# Patient Record
Sex: FEMALE | Race: WHITE | NOT HISPANIC OR LATINO | Employment: FULL TIME | URBAN - METROPOLITAN AREA
[De-identification: names, ages, dates, MRNs, and addresses within clinical notes are randomized per-mention and may not be internally consistent; named-entity substitution may affect disease eponyms.]

---

## 2023-03-24 LAB — PROLACTIN (UG/L) IN SER/PLAS: 2.2 UG/L (ref 3–20)

## 2024-05-29 DIAGNOSIS — E22.1 HYPERPROLACTINEMIA (MULTI): ICD-10-CM

## 2024-05-30 RX ORDER — CABERGOLINE 0.5 MG/1
0.5 TABLET ORAL SEE ADMIN INSTRUCTIONS
Qty: 24 TABLET | Refills: 0 | Status: SHIPPED | OUTPATIENT
Start: 2024-05-30

## 2024-05-30 NOTE — TELEPHONE ENCOUNTER
Dr cabral refilled a 90d supply of cabergoline, but pt is overdue for exam.  Left detailed message on pts cell stating she needs to call and make an appt.

## 2024-06-19 ENCOUNTER — TELEPHONE (OUTPATIENT)
Dept: OBSTETRICS AND GYNECOLOGY | Facility: CLINIC | Age: 34
End: 2024-06-19
Payer: COMMERCIAL

## 2024-06-19 NOTE — TELEPHONE ENCOUNTER
Pt returned call to office.    Discussed cabergoline script.   Pt informed on 5/30/24 dr cabral sent in a 90d supply that should last her well into 8/2024.  Py has appt with dr cabral on 7/31/24 and should not run out before then.    Understanding voiced.

## 2024-06-19 NOTE — TELEPHONE ENCOUNTER
Attempted to call pt to further discuss her message.     Got her voice mail.     Message left to call office.

## 2024-07-31 ENCOUNTER — APPOINTMENT (OUTPATIENT)
Dept: OBSTETRICS AND GYNECOLOGY | Facility: CLINIC | Age: 34
End: 2024-07-31
Payer: COMMERCIAL

## 2024-07-31 VITALS
DIASTOLIC BLOOD PRESSURE: 60 MMHG | HEIGHT: 62 IN | SYSTOLIC BLOOD PRESSURE: 122 MMHG | BODY MASS INDEX: 32.2 KG/M2 | WEIGHT: 175 LBS

## 2024-07-31 DIAGNOSIS — D35.2 PITUITARY MACROADENOMA (MULTI): Primary | ICD-10-CM

## 2024-07-31 DIAGNOSIS — E22.1 HYPERPROLACTINEMIA (MULTI): ICD-10-CM

## 2024-07-31 PROCEDURE — 3008F BODY MASS INDEX DOCD: CPT | Performed by: OBSTETRICS & GYNECOLOGY

## 2024-07-31 PROCEDURE — 99213 OFFICE O/P EST LOW 20 MIN: CPT | Performed by: OBSTETRICS & GYNECOLOGY

## 2024-07-31 PROCEDURE — 1036F TOBACCO NON-USER: CPT | Performed by: OBSTETRICS & GYNECOLOGY

## 2024-07-31 RX ORDER — CABERGOLINE 0.5 MG/1
0.5 TABLET ORAL SEE ADMIN INSTRUCTIONS
Qty: 24 TABLET | Refills: 1 | Status: SHIPPED | OUTPATIENT
Start: 2024-07-31

## 2024-07-31 ASSESSMENT — PAIN SCALES - GENERAL: PAINLEVEL: 0-NO PAIN

## 2024-07-31 NOTE — PROGRESS NOTES
"Rochelle Joseph is a 33 y.o. here for prolactinoma    HPI: She is going to start trying for pregnancy next month.   She would like to go back to the carboline. 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 They are thinking about next baby in 2024.        Social History     Substance and Sexual Activity   Sexual Activity Yes    Partners: Male    Birth control/protection: None     Objective   /60   Ht 1.575 m (5' 2\")   Wt 79.4 kg (175 lb)   BMI 32.01 kg/m²      General:   Alert and oriented, in no acute distress   Neck:    Breast/Axilla:    Abdomen:    Vulva:    Vagina:    Cervix:    Uterus:    Adnexa:    Pelvic Floor    Psych Normal affect. Normal mood.      Assessment and Plan:  Microadenoma  Cabergoline refill- 0.5 mg 2x a week once a week then once a week alternating.  Prolactin  Planing for pregnancy- folate discussed, next pap 2025  Problem List Items Addressed This Visit       Pituitary macroadenoma (Multi) - Primary    Relevant Orders    Prolactin     Other Visit Diagnoses       Hyperprolactinemia (Multi)        Relevant Medications    cabergoline (Dostinex) 0.5 mg tablet           Orders Placed This Encounter   Procedures    Prolactin     Standing Status:   Future     Standing Expiration Date:   7/31/2025     Order Specific Question:   Release result to [employer]     Answer:   Immediate [1]       "

## 2024-11-08 ENCOUNTER — HOSPITAL ENCOUNTER (OUTPATIENT)
Dept: RADIOLOGY | Facility: HOSPITAL | Age: 34
Discharge: HOME | End: 2024-11-08
Payer: COMMERCIAL

## 2024-11-08 ENCOUNTER — TELEPHONE (OUTPATIENT)
Dept: OBSTETRICS AND GYNECOLOGY | Facility: CLINIC | Age: 34
End: 2024-11-08
Payer: COMMERCIAL

## 2024-11-08 DIAGNOSIS — O20.0 THREATENED MISCARRIAGE IN EARLY PREGNANCY (HHS-HCC): ICD-10-CM

## 2024-11-08 PROCEDURE — 76817 TRANSVAGINAL US OBSTETRIC: CPT

## 2024-11-08 PROCEDURE — 76801 OB US < 14 WKS SINGLE FETUS: CPT

## 2024-11-08 NOTE — TELEPHONE ENCOUNTER
Pt contacted.   Certain LMP of 9/19/24.  Has been actively trying to conceive.  Pt endorses menstrual-like cramping on/off since her + UPT on  or about 10/20/2024.  Denies any bright red bleeding.  Pt did have mucousy, dark brown discharge yesterday.  None since.  Will make dr cabral aware.

## 2024-11-08 NOTE — TELEPHONE ENCOUNTER
Pt contacted.   Pt informed  a viability US has been scheduled for this afternoon at [facility] [address] at 2;30pm.

## 2024-11-20 ENCOUNTER — APPOINTMENT (OUTPATIENT)
Dept: OBSTETRICS AND GYNECOLOGY | Facility: CLINIC | Age: 34
End: 2024-11-20
Payer: COMMERCIAL

## 2024-11-20 ENCOUNTER — LAB (OUTPATIENT)
Dept: LAB | Facility: LAB | Age: 34
End: 2024-11-20
Payer: COMMERCIAL

## 2024-11-20 ENCOUNTER — LAB REQUISITION (OUTPATIENT)
Dept: LAB | Facility: HOSPITAL | Age: 34
End: 2024-11-20
Payer: COMMERCIAL

## 2024-11-20 VITALS — WEIGHT: 177 LBS | BODY MASS INDEX: 32.37 KG/M2 | DIASTOLIC BLOOD PRESSURE: 74 MMHG | SYSTOLIC BLOOD PRESSURE: 118 MMHG

## 2024-11-20 DIAGNOSIS — Z3A.08 8 WEEKS GESTATION OF PREGNANCY (HHS-HCC): Primary | ICD-10-CM

## 2024-11-20 DIAGNOSIS — F41.9 ANXIETY: ICD-10-CM

## 2024-11-20 DIAGNOSIS — O34.219 PREVIOUS CESAREAN SECTION COMPLICATING PREGNANCY (HHS-HCC): ICD-10-CM

## 2024-11-20 DIAGNOSIS — Z34.81 PRENATAL CARE, SUBSEQUENT PREGNANCY IN FIRST TRIMESTER (HHS-HCC): ICD-10-CM

## 2024-11-20 DIAGNOSIS — Z33.1 PREGNANT STATE, INCIDENTAL (HHS-HCC): ICD-10-CM

## 2024-11-20 LAB
ABO GROUP (TYPE) IN BLOOD: NORMAL
ANTIBODY SCREEN: NORMAL
ERYTHROCYTE [DISTWIDTH] IN BLOOD BY AUTOMATED COUNT: 11.5 % (ref 11.5–14.5)
EST. AVERAGE GLUCOSE BLD GHB EST-MCNC: 103 MG/DL
HBA1C MFR BLD: 5.2 %
HBV CORE AB SER QL: NONREACTIVE
HBV SURFACE AB SER-ACNC: 36.4 MIU/ML
HBV SURFACE AG SERPL QL IA: NONREACTIVE
HCT VFR BLD AUTO: 39.1 % (ref 36–46)
HCV AB SER QL: NONREACTIVE
HGB BLD-MCNC: 13 G/DL (ref 12–16)
HIV 1+2 AB+HIV1 P24 AG SERPL QL IA: NONREACTIVE
MCH RBC QN AUTO: 29.5 PG (ref 26–34)
MCHC RBC AUTO-ENTMCNC: 33.2 G/DL (ref 32–36)
MCV RBC AUTO: 89 FL (ref 80–100)
NRBC BLD-RTO: 0 /100 WBCS (ref 0–0)
PLATELET # BLD AUTO: 264 X10*3/UL (ref 150–450)
RBC # BLD AUTO: 4.41 X10*6/UL (ref 4–5.2)
REFLEX ADDED, ANEMIA PANEL: NORMAL
RH FACTOR (ANTIGEN D): NORMAL
RUBV IGG SERPL IA-ACNC: 2 IA
RUBV IGG SERPL QL IA: POSITIVE
TREPONEMA PALLIDUM IGG+IGM AB [PRESENCE] IN SERUM OR PLASMA BY IMMUNOASSAY: NONREACTIVE
WBC # BLD AUTO: 9.7 X10*3/UL (ref 4.4–11.3)

## 2024-11-20 PROCEDURE — 86706 HEP B SURFACE ANTIBODY: CPT

## 2024-11-20 PROCEDURE — 83020 HEMOGLOBIN ELECTROPHORESIS: CPT | Performed by: ADVANCED PRACTICE MIDWIFE

## 2024-11-20 PROCEDURE — 36415 COLL VENOUS BLD VENIPUNCTURE: CPT

## 2024-11-20 PROCEDURE — 86317 IMMUNOASSAY INFECTIOUS AGENT: CPT

## 2024-11-20 PROCEDURE — 87086 URINE CULTURE/COLONY COUNT: CPT

## 2024-11-20 PROCEDURE — 0500F INITIAL PRENATAL CARE VISIT: CPT | Performed by: ADVANCED PRACTICE MIDWIFE

## 2024-11-20 PROCEDURE — 87389 HIV-1 AG W/HIV-1&-2 AB AG IA: CPT

## 2024-11-20 PROCEDURE — 86850 RBC ANTIBODY SCREEN: CPT

## 2024-11-20 PROCEDURE — 86780 TREPONEMA PALLIDUM: CPT

## 2024-11-20 PROCEDURE — 83036 HEMOGLOBIN GLYCOSYLATED A1C: CPT

## 2024-11-20 PROCEDURE — 85027 COMPLETE CBC AUTOMATED: CPT

## 2024-11-20 PROCEDURE — 87340 HEPATITIS B SURFACE AG IA: CPT

## 2024-11-20 PROCEDURE — 86803 HEPATITIS C AB TEST: CPT

## 2024-11-20 PROCEDURE — 83021 HEMOGLOBIN CHROMOTOGRAPHY: CPT

## 2024-11-20 PROCEDURE — 87591 N.GONORRHOEAE DNA AMP PROB: CPT

## 2024-11-20 PROCEDURE — 86901 BLOOD TYPING SEROLOGIC RH(D): CPT

## 2024-11-20 PROCEDURE — 86900 BLOOD TYPING SEROLOGIC ABO: CPT

## 2024-11-20 PROCEDURE — 87491 CHLMYD TRACH DNA AMP PROBE: CPT

## 2024-11-20 PROCEDURE — 86704 HEP B CORE ANTIBODY TOTAL: CPT

## 2024-11-20 ASSESSMENT — EDINBURGH POSTNATAL DEPRESSION SCALE (EPDS)
I HAVE BLAMED MYSELF UNNECESSARILY WHEN THINGS WENT WRONG: NO, NEVER
I HAVE FELT SCARED OR PANICKY FOR NO GOOD REASON: YES, SOMETIMES
I HAVE LOOKED FORWARD WITH ENJOYMENT TO THINGS: RATHER LESS THAN I USED TO
THE THOUGHT OF HARMING MYSELF HAS OCCURRED TO ME: NEVER
I HAVE BEEN ANXIOUS OR WORRIED FOR NO GOOD REASON: YES, VERY OFTEN
I HAVE BEEN SO UNHAPPY THAT I HAVE BEEN CRYING: ONLY OCCASIONALLY
THINGS HAVE BEEN GETTING ON TOP OF ME: YES, SOMETIMES I HAVEN'T BEEN COPING AS WELL AS USUAL
I HAVE BEEN ABLE TO LAUGH AND SEE THE FUNNY SIDE OF THINGS: AS MUCH AS I ALWAYS COULD
I HAVE BEEN SO UNHAPPY THAT I HAVE HAD DIFFICULTY SLEEPING: NOT VERY OFTEN
I HAVE FELT SAD OR MISERABLE: NOT VERY OFTEN
TOTAL SCORE: 11

## 2024-11-20 NOTE — PROGRESS NOTES
NOB/BF1 Given  EPDS= 11    Subjective   Rochelle Joseph is a 34 y.o.  at 8w6d with a working estimated date of delivery of 2025, by Last Menstrual Period who presents for an initial prenatal visit. This pregnancy is planned.    Patient currently experiencing: nausea, declines anti-emetics  Bleeding or cramping since LMP: yes - light brown spotting, had viability scan   Taking prenatal vitamin: Yes  Other concerns today:  Ultrasound completed this pregnancy: Yes - 7weeks   Last pap:     OB History    Para Term  AB Living   3 2 2 0 0 2   SAB IAB Ectopic Multiple Live Births   0 0 0 0 2      # Outcome Date GA Lbr Jules/2nd Weight Sex Type Anes PTL Lv   3 Current            2 Term 22   3.515 kg F CS-LTranv  N SOWMYA   1 Term 10/03/20 41w0d  3.232 kg F CS-LTranv  N SOWMYA      Complications: Fetal Intolerance, Chorioamnionitis     Prior pregnancy complications:  C/S x 2  History of hypertension:  No    Past Medical History:   Diagnosis Date    Encounter for contraceptive management, unspecified 2018    Contraceptive management    Encounter for gynecological examination (general) (routine) without abnormal findings 2018    Well woman exam with routine gynecological exam    Encounter for pregnancy test, result positive ([facility]) 2020    Positive urine pregnancy test    Less than 8 weeks gestation of pregnancy ([facility]) 02/10/2020    7 weeks gestation of pregnancy    Other specified disorders of muscle 10/12/2018    Pelvic floor dysfunction    Other specified health status 2018    Known health problems: none    Personal history of other diseases of the female genital tract 2022    History of vulvodynia    Personal history of other diseases of the female genital tract     History of dyspareunia in female    Personal history of other diseases of the musculoskeletal system and connective tissue 2018    History of muscle spasm    Vaginismus 10/12/2018    Vaginismus     Vaginismus 10/12/2018    Vaginismus      Past Surgical History:   Procedure Laterality Date    TONSILLECTOMY  2018    Tonsillectomy      Social History     Socioeconomic History    Marital status:      Spouse name: Roberto   Tobacco Use    Smoking status: Never    Smokeless tobacco: Never   Vaping Use    Vaping status: Never Used   Substance and Sexual Activity    Alcohol use: Yes     Comment: occ    Drug use: Never    Sexual activity: Yes     Partners: Male     Birth control/protection: None      [place]  Depression Scale Total: 11    Objective   Physical Exam  Weight: 80.3 kg (177 lb)  TW.907 kg (2 lb)   Pregravid BMI: 32.00  BP: 118/74    Physical Exam  Constitutional:       Appearance: Normal appearance. She is normal weight.   Genitourinary:      Genitourinary Comments: Declines    Breasts:     Right: Normal.      Left: Normal.   Cardiovascular:      Rate and Rhythm: Normal rate and regular rhythm.   Pulmonary:      Effort: Pulmonary effort is normal.      Breath sounds: Normal breath sounds.   Abdominal:      General: Abdomen is flat.   Neurological:      Mental Status: She is alert.   Psychiatric:         Mood and Affect: Mood normal.         Behavior: Behavior normal.         Thought Content: Thought content normal.         Judgment: Judgment normal.          Assessment   Problem List Items Addressed This Visit    None  Visit Diagnoses       Pregnant state, incidental ([facility])        Relevant Orders    C. trachomatis / N. gonorrhoeae, Amplified    Urine Culture    CBC Anemia Panel With Reflex,Pregnancy    Hemoglobin Identification with Path Review    Hepatitis B Surface Antigen    Hepatitis C Antibody    HIV 1/2 Antigen/Antibody Screen with Reflex to Confirmation    Rubella Antibody, IgG    Syphilis Screen with Reflex    Type And Screen    [facility] Prenatal Screen    Hemoglobin A1C    Hepatitis B Core Antibody, Total    Hepatitis B Surface Antibody    Trichomonas  vaginalis, Amplified             Plan   - New OB resources provided and reviewed with particular attention to dietary, travel, and medication restrictions  - Oriented to practice, CNM vs. MD care  - Reviewed IOM recommendations for weight gain given pt's BMI: 11-20 pounds (BMI greater than or equal to 30)  - Reviewed bleeding precautions, warning signs, when to call provider; phone number provided  - Routine NOB labs ordered  - NIPT discussed with patient: patient declines genetic testing  - Nuchal Translucency ultrasound ordered  - Transfer care to MD due to Dr. Broussard patient   - Return in 4 weeks for routine prenatal care    LESLYE Mcguire-VALERIAM

## 2024-11-21 PROBLEM — Z34.81 PRENATAL CARE, SUBSEQUENT PREGNANCY IN FIRST TRIMESTER (HHS-HCC): Status: ACTIVE | Noted: 2024-11-21

## 2024-11-21 PROBLEM — Z3A.08 8 WEEKS GESTATION OF PREGNANCY (HHS-HCC): Status: ACTIVE | Noted: 2024-11-21

## 2024-11-21 PROBLEM — F41.9 ANXIETY: Status: ACTIVE | Noted: 2024-11-21

## 2024-11-21 PROBLEM — O34.219 PREVIOUS CESAREAN SECTION COMPLICATING PREGNANCY (HHS-HCC): Status: ACTIVE | Noted: 2024-11-21

## 2024-11-21 LAB
BACTERIA UR CULT: NORMAL
C TRACH RRNA SPEC QL NAA+PROBE: NEGATIVE
HEMOGLOBIN A2: 3.2 % (ref 2–3.5)
HEMOGLOBIN A: 96.5 % (ref 95.8–98)
HEMOGLOBIN F: 0.3 % (ref 0–2)
HEMOGLOBIN IDENTIFICATION INTERPRETATION: NORMAL
N GONORRHOEA DNA SPEC QL PROBE+SIG AMP: NEGATIVE
PATH REVIEW-HGB IDENTIFICATION: NORMAL

## 2024-11-21 NOTE — ASSESSMENT & PLAN NOTE
Visibly anxious. Has need phobia.   Reports she is open to establishing with someone, referral sent.

## 2024-11-22 ENCOUNTER — APPOINTMENT (OUTPATIENT)
Dept: OBSTETRICS AND GYNECOLOGY | Facility: CLINIC | Age: 34
End: 2024-11-22
Payer: COMMERCIAL

## 2024-12-12 ENCOUNTER — HOSPITAL ENCOUNTER (OUTPATIENT)
Dept: RADIOLOGY | Facility: CLINIC | Age: 34
Discharge: HOME | End: 2024-12-12
Payer: COMMERCIAL

## 2024-12-12 DIAGNOSIS — O20.0 THREATENED MISCARRIAGE IN EARLY PREGNANCY (HHS-HCC): ICD-10-CM

## 2024-12-12 PROCEDURE — 76813 OB US NUCHAL MEAS 1 GEST: CPT

## 2024-12-17 ENCOUNTER — APPOINTMENT (OUTPATIENT)
Dept: OBSTETRICS AND GYNECOLOGY | Facility: CLINIC | Age: 34
End: 2024-12-17
Payer: COMMERCIAL

## 2024-12-17 VITALS — WEIGHT: 176 LBS | DIASTOLIC BLOOD PRESSURE: 70 MMHG | BODY MASS INDEX: 32.19 KG/M2 | SYSTOLIC BLOOD PRESSURE: 128 MMHG

## 2024-12-17 DIAGNOSIS — Z34.81 PRENATAL CARE, SUBSEQUENT PREGNANCY IN FIRST TRIMESTER (HHS-HCC): ICD-10-CM

## 2024-12-17 DIAGNOSIS — Z3A.12 12 WEEKS GESTATION OF PREGNANCY (HHS-HCC): Primary | ICD-10-CM

## 2024-12-17 PROCEDURE — 0501F PRENATAL FLOW SHEET: CPT

## 2024-12-17 RX ORDER — NAPROXEN SODIUM 220 MG/1
162 TABLET, FILM COATED ORAL DAILY
Qty: 180 TABLET | Refills: 2 | Status: SHIPPED | OUTPATIENT
Start: 2024-12-17 | End: 2025-09-13

## 2024-12-17 NOTE — PROGRESS NOTES
Assessment/Plan   34 y.o.  at 12w5d  - Reviewed results of NOB labs and NT scan, all wnl  - Reviewed recommendation for universal bASA prophylaxis, pt accepts. Rx sent.   - Inquired about flu vaccination, pt declines  - Routine prenatal care    Follow up in 4 weeks for next prenatal visit. Pt is scheduled with Dr. Broussard.     KIARA Parham    Subjective     Rochelle Joseph is a 34 y.o.  at 12w5d with a working estimated date of delivery of 2025, by Last Menstrual Period presenting for a routine prenatal visit. She is doing well, no concerns. She denies vaginal bleeding, leakage of fluid, contractions, or decreased fetal movement.    Feeling better, less nauseous.     Her pregnancy is complicated by:  Pregnancy Problems (from 24 to present)       Problem Noted Diagnosed Resolved    Prenatal care, subsequent pregnancy in first trimester ([facility]) 2024 by KIARA Mcguire  No    Priority:  Medium       12 weeks gestation of pregnancy ([facility]) 2024 by KIARA Mcguire  No    Priority:  Medium       Overview Addendum 2024  8:48 AM by KIARA Parham     Desired provider in labor: [] CNM  [x] Physician-->repeat c/s x 3  [] Blood Products: [] Yes, accepts [] No, needs counseling  [x] Initial BMI: 32.00   [x] Prenatal Labs: wnl  [x] Cervical Cancer Screening up to date: due    [x] Rh status:  O+  [x] NT US: (11-13 wks): 0.9mm wnl  [] Baby ASA (if indicated):  [x] Pregnancy dated by: LMP = 7w1d scan    [] Anatomy US: (19-20 wks)  [] Federal Sterilization consent signed (if indicated):  [] 1hr GCT at 24-28wks:  [] Rhogam (if indicated):   [] Fetal Surveillance (if indicated):  [] Tdap (27-32 wks, may be given up to 36 wks if initial window missed):   [] RSV (32-36 wks) (Sept. to end of ):   [] Flu Vaccine:    [] Breastfeeding:  [] Postpartum Birth control method:   [] GBS at 36 - 37 wks:  [] 39 weeks discussion of IOL  vs. Expectant management:  [] Mode of delivery ( anticipated ):                   Objective   Weight: 79.8 kg (176 lb)  TW.454 kg (1 lb)   Expected Total Weight Gain: 5 kg (11 lb)-9 kg (19 lb)   Pregravid BMI: 32.00  Pregravid Weight: 79.4 kg (175 lb)   BP: 128/70  Fetal Heart Rate: 167

## 2024-12-18 ENCOUNTER — APPOINTMENT (OUTPATIENT)
Dept: RADIOLOGY | Facility: HOSPITAL | Age: 34
End: 2024-12-18
Payer: COMMERCIAL

## 2024-12-19 ENCOUNTER — APPOINTMENT (OUTPATIENT)
Dept: OBSTETRICS AND GYNECOLOGY | Facility: CLINIC | Age: 34
End: 2024-12-19
Payer: COMMERCIAL

## 2025-01-15 ENCOUNTER — APPOINTMENT (OUTPATIENT)
Dept: OBSTETRICS AND GYNECOLOGY | Facility: CLINIC | Age: 35
End: 2025-01-15
Payer: COMMERCIAL

## 2025-01-15 VITALS — BODY MASS INDEX: 32.56 KG/M2 | SYSTOLIC BLOOD PRESSURE: 106 MMHG | WEIGHT: 178 LBS | DIASTOLIC BLOOD PRESSURE: 58 MMHG

## 2025-01-15 DIAGNOSIS — Z3A.12 12 WEEKS GESTATION OF PREGNANCY (HHS-HCC): ICD-10-CM

## 2025-01-15 DIAGNOSIS — Z34.82 PRENATAL CARE, SUBSEQUENT PREGNANCY IN SECOND TRIMESTER (HHS-HCC): ICD-10-CM

## 2025-01-15 DIAGNOSIS — Z3A.16 16 WEEKS GESTATION OF PREGNANCY (HHS-HCC): Primary | ICD-10-CM

## 2025-01-15 DIAGNOSIS — Z34.81 PRENATAL CARE, SUBSEQUENT PREGNANCY IN FIRST TRIMESTER (HHS-HCC): ICD-10-CM

## 2025-01-15 PROCEDURE — 0501F PRENATAL FLOW SHEET: CPT | Performed by: ADVANCED PRACTICE MIDWIFE

## 2025-01-15 RX ORDER — NAPROXEN SODIUM 220 MG/1
162 TABLET, FILM COATED ORAL DAILY
Qty: 180 TABLET | Refills: 2 | Status: SHIPPED | OUTPATIENT
Start: 2025-01-15 | End: 2025-10-12

## 2025-01-15 NOTE — PROGRESS NOTES
Subjective     Rochelle Joseph is a 34 y.o.  at 16w6d with a working estimated date of delivery of 2025, by Last Menstrual Period who presents for a routine prenatal visit.     She denies vaginal bleeding, leakage of fluid, decreased fetal movements, or contractions.    Objective   Physical Exam  Weight: 80.7 kg (178 lb)  TW.361 kg (3 lb)  BP: 106/58       Postpartum Depression: High Risk (2024)    [place]  Depression Scale     Last EPDS Total Score: 11     Last EPDS Self Harm Result: Never         Assessment/Plan   Problem List Items Addressed This Visit          Medium    Prenatal care, subsequent pregnancy in first trimester ([facility])    Relevant Medications    aspirin 81 mg chewable tablet     Other Visit Diagnoses       12 weeks gestation of pregnancy ([facility])        Relevant Medications    aspirin 81 mg chewable tablet          Anatomy US scheduled  Discussed flu vaccine, patient declined  Reviewed s/sx of PTL, warning signs, fetal movement counts, and when to call provider  Follow up in 4 weeks for a routine prenatal visit.    LESLYE Mcguire-CLARK

## 2025-02-10 ENCOUNTER — HOSPITAL ENCOUNTER (OUTPATIENT)
Dept: RADIOLOGY | Facility: CLINIC | Age: 35
Discharge: HOME | End: 2025-02-10
Payer: COMMERCIAL

## 2025-02-10 DIAGNOSIS — O20.0 THREATENED MISCARRIAGE IN EARLY PREGNANCY (HHS-HCC): ICD-10-CM

## 2025-02-10 PROBLEM — Z3A.20 20 WEEKS GESTATION OF PREGNANCY (HHS-HCC): Status: ACTIVE | Noted: 2024-11-21

## 2025-02-10 PROCEDURE — 76811 OB US DETAILED SNGL FETUS: CPT | Performed by: STUDENT IN AN ORGANIZED HEALTH CARE EDUCATION/TRAINING PROGRAM

## 2025-02-10 PROCEDURE — 76811 OB US DETAILED SNGL FETUS: CPT

## 2025-02-10 NOTE — PROGRESS NOTES
Reg pack given       Ob Follow-up  25     SUBJECTIVE      HPI: Rochelle Joseph is a 34 y.o.  at 20w4d here for RPNV.  She has no contractions, no bleeding, or no LOF. Reports some normal fetal movement.        OBJECTIVE  Visit Vitals  /62   Wt 82.6 kg (182 lb)   LMP 2024   BMI 33.29 kg/m²   OB Status Pregnant   Smoking Status Never   BSA 1.9 m²            ASSESSMENT & PLAN    Rochelle Joseph is a 34 y.o.  at 20w4d here for the following concerns we addressed today:    Problem List Items Addressed This Visit          Ob-Gyn Problems    Previous  section complicating pregnancy ([facility])    Overview     Hx of C/S x 2         Prenatal care, subsequent pregnancy in second trimester ([facility])    Relevant Orders    CBC Anemia Panel With Reflex,Pregnancy    Glucose, 1 Hour Screen, Pregnancy    Syphilis Screen with Reflex    History of gestational diabetes in prior pregnancy, currently pregnant ([facility])    Overview     If she fails her one hour wants to just to go to treatment and GDM diet control as she did in last pregnancy.         20 weeks gestation of pregnancy ([facility]) - Primary    Overview     Desired provider in labor: [] CNM  [x] Physician-->repeat c/s x 3  [x] Blood Products: [x] Yes, accepts [] No, needs counseling  [x] Initial BMI: 32.00   [x] Prenatal Labs: wnl  [x] Cervical Cancer Screening up to date: due    [x] Rh status:  O+  [x] NT US: (11-13 wks): 0.9mm wnl  [x] Baby ASA (if indicated): sent   [x] Pregnancy dated by: LMP = 7w1d scan    [x] Anatomy US: (19-20 wks)  [] Federal Sterilization consent signed (if indicated):  [] 1hr GCT at 24-28wks:  [] Rhogam (if indicated):   [] Fetal Surveillance (if indicated):  [] Tdap (27-32 wks, may be given up to 36 wks if initial window missed):   [] RSV (32-36 wks) (Sept. to end of ):       [] Breastfeeding:  [] Postpartum Birth control method:   [] GBS at 36 - 37 wks:  [] 39 weeks discussion of IOL vs. Expectant  management:  [x] Mode of delivery ( anticipated ):  C/S              Orders Placed This Encounter   Procedures    CBC Anemia Panel With Reflex,Pregnancy     Standing Status:   Future     Number of Occurrences:   1     Standing Expiration Date:   2/12/2026     Order Specific Question:   Release result to MyChart     Answer:   Immediate [1]     Order Specific Question:   [facility] Carveout?     Answer:   CARVEOUT: SEND TO [facility]    Glucose, 1 Hour Screen, Pregnancy     Standing Status:   Future     Number of Occurrences:   1     Standing Expiration Date:   2/12/2026     Order Specific Question:   Release result to MyChart     Answer:   Immediate [1]    Syphilis Screen with Reflex     Standing Status:   Future     Number of Occurrences:   1     Standing Expiration Date:   2/12/2026     Order Specific Question:   Release result to [employer]     Answer:   Immediate [1]        RTC in 4 weeks      Corinne A Bazella, MD

## 2025-02-12 ENCOUNTER — APPOINTMENT (OUTPATIENT)
Dept: OBSTETRICS AND GYNECOLOGY | Facility: CLINIC | Age: 35
End: 2025-02-12
Payer: COMMERCIAL

## 2025-02-12 VITALS — DIASTOLIC BLOOD PRESSURE: 62 MMHG | SYSTOLIC BLOOD PRESSURE: 106 MMHG | BODY MASS INDEX: 33.29 KG/M2 | WEIGHT: 182 LBS

## 2025-02-12 DIAGNOSIS — Z3A.20 20 WEEKS GESTATION OF PREGNANCY (HHS-HCC): Primary | ICD-10-CM

## 2025-02-12 DIAGNOSIS — O34.219 PREVIOUS CESAREAN SECTION COMPLICATING PREGNANCY (HHS-HCC): ICD-10-CM

## 2025-02-12 DIAGNOSIS — Z86.32 HISTORY OF GESTATIONAL DIABETES IN PRIOR PREGNANCY, CURRENTLY PREGNANT (HHS-HCC): ICD-10-CM

## 2025-02-12 DIAGNOSIS — Z34.82 PRENATAL CARE, SUBSEQUENT PREGNANCY IN SECOND TRIMESTER (HHS-HCC): ICD-10-CM

## 2025-02-12 DIAGNOSIS — O09.299 HISTORY OF GESTATIONAL DIABETES IN PRIOR PREGNANCY, CURRENTLY PREGNANT (HHS-HCC): ICD-10-CM

## 2025-02-12 PROCEDURE — 0501F PRENATAL FLOW SHEET: CPT | Performed by: OBSTETRICS & GYNECOLOGY

## 2025-03-12 ENCOUNTER — APPOINTMENT (OUTPATIENT)
Dept: OBSTETRICS AND GYNECOLOGY | Facility: CLINIC | Age: 35
End: 2025-03-12
Payer: COMMERCIAL

## 2025-03-12 VITALS — WEIGHT: 182 LBS | DIASTOLIC BLOOD PRESSURE: 64 MMHG | BODY MASS INDEX: 33.29 KG/M2 | SYSTOLIC BLOOD PRESSURE: 126 MMHG

## 2025-03-12 DIAGNOSIS — Z34.82 PRENATAL CARE, SUBSEQUENT PREGNANCY IN SECOND TRIMESTER (HHS-HCC): Primary | ICD-10-CM

## 2025-03-12 DIAGNOSIS — Z86.32 HISTORY OF GESTATIONAL DIABETES IN PRIOR PREGNANCY, CURRENTLY PREGNANT (HHS-HCC): ICD-10-CM

## 2025-03-12 DIAGNOSIS — O09.299 HISTORY OF GESTATIONAL DIABETES IN PRIOR PREGNANCY, CURRENTLY PREGNANT (HHS-HCC): ICD-10-CM

## 2025-03-12 DIAGNOSIS — O34.219 PREVIOUS CESAREAN SECTION COMPLICATING PREGNANCY (HHS-HCC): ICD-10-CM

## 2025-03-12 DIAGNOSIS — Z3A.24 24 WEEKS GESTATION OF PREGNANCY (HHS-HCC): ICD-10-CM

## 2025-03-12 PROCEDURE — 0501F PRENATAL FLOW SHEET: CPT | Performed by: OBSTETRICS & GYNECOLOGY

## 2025-03-12 NOTE — PROGRESS NOTES
Chaperone declined: Alyssa Trimble, CM3      Ob Follow-up  25     SUBJECTIVE      HPI: Rochelle Joseph is a 34 y.o.  at 24w6d here for RPNV.  She has no contractions, no bleeding, and no LOF. Reports normal fetal movement, less than previous pregnancies due to anterior placenta. Overall feeling well with no concerns or complaints.        OBJECTIVE  Visit Vitals  /64   Wt 82.6 kg (182 lb)   LMP 2024   BMI 33.29 kg/m²   OB Status Pregnant   Smoking Status Never   BSA 1.9 m²        Fundal height 24in    ASSESSMENT & PLAN    Rochelle Joseph is a 34 y.o.  at 24w6d here for the following concerns we addressed today:    Problem List Items Addressed This Visit          Ob-Gyn Problems    Previous  section complicating pregnancy ([facility])    Overview     Hx of C/S x 2         Prenatal care, subsequent pregnancy in second trimester ([facility]) - Primary    History of gestational diabetes in prior pregnancy, currently pregnant ([facility])    Overview     If she fails her one hour wants to just to go to treatment and GDM diet control as she did in last pregnancy.         24 weeks gestation of pregnancy ([facility])    Overview     Desired provider in labor: [] CNM  [x] Physician-->repeat c/s x 3  [x] Blood Products: [x] Yes, accepts [] No, needs counseling  [x] Initial BMI: 32.00   [x] Prenatal Labs: wnl  [x] Cervical Cancer Screening up to date: due    [x] Rh status:  O+  [x] NT US: (11-13 wks): 0.9mm wnl  [x] Baby ASA (if indicated): sent   [x] Pregnancy dated by: LMP = 7w1d scan    [x] Anatomy US: (19-20 wks)  [] Federal Sterilization consent signed (if indicated):  [] 1hr GCT at 24-28wks:  [] Rhogam (if indicated):   [] Fetal Surveillance (if indicated):  [] Tdap (27-32 wks, may be given up to 36 wks if initial window missed):   [] RSV (32-36 wks) (Sept. to end of ):       [] Breastfeeding:  [] Postpartum Birth control method:   [] GBS at 36 - 37 wks:  [] 39 weeks discussion  of IOL vs. Expectant management:  [x] Mode of delivery ( anticipated ):  C/S              No orders of the defined types were placed in this encounter.       RTC in 4 weeks  1hr GCT, if fail then proceed to daily glucose monitoring and diet control  Visit completed with edits from Jemal Thayer MS 3    Corinne A Bazella, MD

## 2025-03-13 ENCOUNTER — APPOINTMENT (OUTPATIENT)
Dept: RADIOLOGY | Facility: HOSPITAL | Age: 35
End: 2025-03-13
Payer: COMMERCIAL

## 2025-03-13 ENCOUNTER — TELEPHONE (OUTPATIENT)
Dept: OBSTETRICS AND GYNECOLOGY | Facility: CLINIC | Age: 35
End: 2025-03-13
Payer: COMMERCIAL

## 2025-03-13 ENCOUNTER — HOSPITAL ENCOUNTER (OUTPATIENT)
Facility: HOSPITAL | Age: 35
Discharge: HOME | End: 2025-03-13
Attending: OBSTETRICS & GYNECOLOGY | Admitting: OBSTETRICS & GYNECOLOGY
Payer: COMMERCIAL

## 2025-03-13 ENCOUNTER — HOSPITAL ENCOUNTER (OUTPATIENT)
Facility: HOSPITAL | Age: 35
End: 2025-03-13
Attending: OBSTETRICS & GYNECOLOGY | Admitting: OBSTETRICS & GYNECOLOGY
Payer: COMMERCIAL

## 2025-03-13 VITALS
HEIGHT: 62 IN | HEART RATE: 95 BPM | RESPIRATION RATE: 16 BRPM | WEIGHT: 182 LBS | DIASTOLIC BLOOD PRESSURE: 67 MMHG | OXYGEN SATURATION: 99 % | SYSTOLIC BLOOD PRESSURE: 120 MMHG | BODY MASS INDEX: 33.49 KG/M2 | TEMPERATURE: 98.5 F

## 2025-03-13 LAB
ALBUMIN SERPL BCP-MCNC: 3.7 G/DL (ref 3.4–5)
ALP SERPL-CCNC: 55 U/L (ref 33–110)
ALT SERPL W P-5'-P-CCNC: 11 U/L (ref 7–45)
ANION GAP SERPL CALC-SCNC: 11 MMOL/L (ref 10–20)
APPEARANCE UR: CLEAR
AST SERPL W P-5'-P-CCNC: 12 U/L (ref 9–39)
BACTERIA #/AREA URNS AUTO: ABNORMAL /HPF
BASOPHILS # BLD AUTO: 0.01 X10*3/UL (ref 0–0.1)
BASOPHILS NFR BLD AUTO: 0.1 %
BILIRUB SERPL-MCNC: 0.2 MG/DL (ref 0–1.2)
BILIRUB UR STRIP.AUTO-MCNC: NEGATIVE MG/DL
BUN SERPL-MCNC: 8 MG/DL (ref 6–23)
CALCIUM SERPL-MCNC: 8.9 MG/DL (ref 8.6–10.3)
CHLORIDE SERPL-SCNC: 104 MMOL/L (ref 98–107)
CO2 SERPL-SCNC: 25 MMOL/L (ref 21–32)
COLOR UR: YELLOW
CREAT SERPL-MCNC: 0.66 MG/DL (ref 0.5–1.05)
EGFRCR SERPLBLD CKD-EPI 2021: >90 ML/MIN/1.73M*2
EOSINOPHIL # BLD AUTO: 0 X10*3/UL (ref 0–0.7)
EOSINOPHIL NFR BLD AUTO: 0 %
ERYTHROCYTE [DISTWIDTH] IN BLOOD BY AUTOMATED COUNT: 12.9 % (ref 11.5–14.5)
FLUAV RNA RESP QL NAA+PROBE: NOT DETECTED
FLUBV RNA RESP QL NAA+PROBE: NOT DETECTED
GLUCOSE SERPL-MCNC: 111 MG/DL (ref 74–99)
GLUCOSE UR STRIP.AUTO-MCNC: NORMAL MG/DL
HCT VFR BLD AUTO: 35 % (ref 36–46)
HGB BLD-MCNC: 11.7 G/DL (ref 12–16)
IMM GRANULOCYTES # BLD AUTO: 0.07 X10*3/UL (ref 0–0.7)
IMM GRANULOCYTES NFR BLD AUTO: 0.7 % (ref 0–0.9)
KETONES UR STRIP.AUTO-MCNC: NEGATIVE MG/DL
LEUKOCYTE ESTERASE UR QL STRIP.AUTO: NEGATIVE
LYMPHOCYTES # BLD AUTO: 0.97 X10*3/UL (ref 1.2–4.8)
LYMPHOCYTES NFR BLD AUTO: 9.1 %
MCH RBC QN AUTO: 30.4 PG (ref 26–34)
MCHC RBC AUTO-ENTMCNC: 33.4 G/DL (ref 32–36)
MCV RBC AUTO: 91 FL (ref 80–100)
MONOCYTES # BLD AUTO: 0.38 X10*3/UL (ref 0.1–1)
MONOCYTES NFR BLD AUTO: 3.6 %
MUCOUS THREADS #/AREA URNS AUTO: ABNORMAL /LPF
NEUTROPHILS # BLD AUTO: 9.18 X10*3/UL (ref 1.2–7.7)
NEUTROPHILS NFR BLD AUTO: 86.5 %
NITRITE UR QL STRIP.AUTO: NEGATIVE
NRBC BLD-RTO: 0 /100 WBCS (ref 0–0)
PH UR STRIP.AUTO: 6 [PH]
PLATELET # BLD AUTO: 271 X10*3/UL (ref 150–450)
POTASSIUM SERPL-SCNC: 3.8 MMOL/L (ref 3.5–5.3)
PROT SERPL-MCNC: 6.6 G/DL (ref 6.4–8.2)
PROT UR STRIP.AUTO-MCNC: ABNORMAL MG/DL
RBC # BLD AUTO: 3.85 X10*6/UL (ref 4–5.2)
RBC # UR STRIP.AUTO: ABNORMAL MG/DL
RBC #/AREA URNS AUTO: >20 /HPF
SARS-COV-2 RNA RESP QL NAA+PROBE: NOT DETECTED
SODIUM SERPL-SCNC: 136 MMOL/L (ref 136–145)
SP GR UR STRIP.AUTO: 1.03
SQUAMOUS #/AREA URNS AUTO: ABNORMAL /HPF
UROBILINOGEN UR STRIP.AUTO-MCNC: NORMAL MG/DL
WBC # BLD AUTO: 10.6 X10*3/UL (ref 4.4–11.3)
WBC #/AREA URNS AUTO: ABNORMAL /HPF

## 2025-03-13 PROCEDURE — 2500000001 HC RX 250 WO HCPCS SELF ADMINISTERED DRUGS (ALT 637 FOR MEDICARE OP): Performed by: OBSTETRICS & GYNECOLOGY

## 2025-03-13 PROCEDURE — 36415 COLL VENOUS BLD VENIPUNCTURE: CPT

## 2025-03-13 PROCEDURE — 85025 COMPLETE CBC W/AUTO DIFF WBC: CPT | Performed by: OBSTETRICS & GYNECOLOGY

## 2025-03-13 PROCEDURE — 2500000004 HC RX 250 GENERAL PHARMACY W/ HCPCS (ALT 636 FOR OP/ED): Performed by: OBSTETRICS & GYNECOLOGY

## 2025-03-13 PROCEDURE — 96374 THER/PROPH/DIAG INJ IV PUSH: CPT

## 2025-03-13 PROCEDURE — 76770 US EXAM ABDO BACK WALL COMP: CPT

## 2025-03-13 PROCEDURE — 76770 US EXAM ABDO BACK WALL COMP: CPT | Performed by: RADIOLOGY

## 2025-03-13 PROCEDURE — 36415 COLL VENOUS BLD VENIPUNCTURE: CPT | Performed by: OBSTETRICS & GYNECOLOGY

## 2025-03-13 PROCEDURE — 80053 COMPREHEN METABOLIC PANEL: CPT | Performed by: OBSTETRICS & GYNECOLOGY

## 2025-03-13 PROCEDURE — 87636 SARSCOV2 & INF A&B AMP PRB: CPT | Performed by: OBSTETRICS & GYNECOLOGY

## 2025-03-13 PROCEDURE — 99214 OFFICE O/P EST MOD 30 MIN: CPT | Performed by: OBSTETRICS & GYNECOLOGY

## 2025-03-13 PROCEDURE — 81001 URINALYSIS AUTO W/SCOPE: CPT | Performed by: OBSTETRICS & GYNECOLOGY

## 2025-03-13 PROCEDURE — 99215 OFFICE O/P EST HI 40 MIN: CPT | Mod: 25

## 2025-03-13 RX ORDER — CYCLOBENZAPRINE HCL 5 MG
5 TABLET ORAL ONCE
Status: COMPLETED | OUTPATIENT
Start: 2025-03-13 | End: 2025-03-13

## 2025-03-13 RX ORDER — LABETALOL HYDROCHLORIDE 5 MG/ML
20 INJECTION, SOLUTION INTRAVENOUS ONCE AS NEEDED
Status: DISCONTINUED | OUTPATIENT
Start: 2025-03-13 | End: 2025-03-13 | Stop reason: HOSPADM

## 2025-03-13 RX ORDER — HYDROMORPHONE HYDROCHLORIDE 1 MG/ML
1 INJECTION, SOLUTION INTRAMUSCULAR; INTRAVENOUS; SUBCUTANEOUS
Status: DISCONTINUED | OUTPATIENT
Start: 2025-03-13 | End: 2025-03-13 | Stop reason: HOSPADM

## 2025-03-13 RX ORDER — ONDANSETRON 4 MG/1
4 TABLET, FILM COATED ORAL EVERY 6 HOURS PRN
Status: DISCONTINUED | OUTPATIENT
Start: 2025-03-13 | End: 2025-03-13 | Stop reason: HOSPADM

## 2025-03-13 RX ORDER — ACETAMINOPHEN 325 MG/1
975 TABLET ORAL ONCE
Status: COMPLETED | OUTPATIENT
Start: 2025-03-13 | End: 2025-03-13

## 2025-03-13 RX ORDER — ONDANSETRON HYDROCHLORIDE 2 MG/ML
4 INJECTION, SOLUTION INTRAVENOUS EVERY 6 HOURS PRN
Status: DISCONTINUED | OUTPATIENT
Start: 2025-03-13 | End: 2025-03-13 | Stop reason: HOSPADM

## 2025-03-13 RX ORDER — HYDRALAZINE HYDROCHLORIDE 20 MG/ML
5 INJECTION INTRAMUSCULAR; INTRAVENOUS ONCE AS NEEDED
Status: DISCONTINUED | OUTPATIENT
Start: 2025-03-13 | End: 2025-03-13 | Stop reason: HOSPADM

## 2025-03-13 RX ORDER — LIDOCAINE HYDROCHLORIDE 10 MG/ML
0.5 INJECTION, SOLUTION EPIDURAL; INFILTRATION; INTRACAUDAL; PERINEURAL ONCE AS NEEDED
Status: DISCONTINUED | OUTPATIENT
Start: 2025-03-13 | End: 2025-03-13 | Stop reason: HOSPADM

## 2025-03-13 RX ADMIN — ONDANSETRON 4 MG: 2 INJECTION INTRAMUSCULAR; INTRAVENOUS at 11:18

## 2025-03-13 RX ADMIN — CYCLOBENZAPRINE HYDROCHLORIDE 5 MG: 5 TABLET, FILM COATED ORAL at 11:53

## 2025-03-13 RX ADMIN — HYDROMORPHONE HYDROCHLORIDE 1 MG: 1 INJECTION, SOLUTION INTRAMUSCULAR; INTRAVENOUS; SUBCUTANEOUS at 12:16

## 2025-03-13 RX ADMIN — SODIUM CHLORIDE, SODIUM LACTATE, POTASSIUM CHLORIDE, AND CALCIUM CHLORIDE 1000 ML: .6; .31; .03; .02 INJECTION, SOLUTION INTRAVENOUS at 11:19

## 2025-03-13 RX ADMIN — ACETAMINOPHEN 975 MG: 325 TABLET ORAL at 14:15

## 2025-03-13 SDOH — HEALTH STABILITY: MENTAL HEALTH: SUICIDAL BEHAVIOR (LIFETIME): NO

## 2025-03-13 SDOH — SOCIAL STABILITY: SOCIAL INSECURITY: DO YOU FEEL ANYONE HAS EXPLOITED OR TAKEN ADVANTAGE OF YOU FINANCIALLY OR OF YOUR PERSONAL PROPERTY?: NO

## 2025-03-13 SDOH — HEALTH STABILITY: MENTAL HEALTH: NON-SPECIFIC ACTIVE SUICIDAL THOUGHTS (PAST 1 MONTH): NO

## 2025-03-13 SDOH — SOCIAL STABILITY: SOCIAL INSECURITY: ABUSE SCREEN: ADULT

## 2025-03-13 SDOH — HEALTH STABILITY: MENTAL HEALTH: WERE YOU ABLE TO COMPLETE ALL THE BEHAVIORAL HEALTH SCREENINGS?: YES

## 2025-03-13 SDOH — SOCIAL STABILITY: SOCIAL INSECURITY: ARE THERE ANY APPARENT SIGNS OF INJURIES/BEHAVIORS THAT COULD BE RELATED TO ABUSE/NEGLECT?: NO

## 2025-03-13 SDOH — SOCIAL STABILITY: SOCIAL INSECURITY: VERBAL ABUSE: DENIES

## 2025-03-13 SDOH — SOCIAL STABILITY: SOCIAL INSECURITY: ARE YOU OR HAVE YOU BEEN THREATENED OR ABUSED PHYSICALLY, EMOTIONALLY, OR SEXUALLY BY ANYONE?: NO

## 2025-03-13 SDOH — SOCIAL STABILITY: SOCIAL INSECURITY: HAVE YOU HAD THOUGHTS OF HARMING ANYONE ELSE?: NO

## 2025-03-13 SDOH — SOCIAL STABILITY: SOCIAL INSECURITY: HAVE YOU HAD ANY THOUGHTS OF HARMING ANYONE ELSE?: NO

## 2025-03-13 SDOH — SOCIAL STABILITY: SOCIAL INSECURITY: DOES ANYONE TRY TO KEEP YOU FROM HAVING/CONTACTING OTHER FRIENDS OR DOING THINGS OUTSIDE YOUR HOME?: NO

## 2025-03-13 SDOH — SOCIAL STABILITY: SOCIAL INSECURITY: PHYSICAL ABUSE: DENIES

## 2025-03-13 SDOH — HEALTH STABILITY: MENTAL HEALTH: WISH TO BE DEAD (PAST 1 MONTH): NO

## 2025-03-13 SDOH — SOCIAL STABILITY: SOCIAL INSECURITY: HAS ANYONE EVER THREATENED TO HURT YOUR FAMILY OR YOUR PETS?: NO

## 2025-03-13 SDOH — ECONOMIC STABILITY: HOUSING INSECURITY: DO YOU FEEL UNSAFE GOING BACK TO THE PLACE WHERE YOU ARE LIVING?: NO

## 2025-03-13 ASSESSMENT — LIFESTYLE VARIABLES
AUDIT-C TOTAL SCORE: 0
HOW OFTEN DO YOU HAVE 6 OR MORE DRINKS ON ONE OCCASION: NEVER
SKIP TO QUESTIONS 9-10: 1
HOW MANY STANDARD DRINKS CONTAINING ALCOHOL DO YOU HAVE ON A TYPICAL DAY: PATIENT DOES NOT DRINK
AUDIT-C TOTAL SCORE: 0
HOW OFTEN DO YOU HAVE A DRINK CONTAINING ALCOHOL: NEVER

## 2025-03-13 ASSESSMENT — PAIN SCALES - GENERAL
PAINLEVEL_OUTOF10: 4
PAINLEVEL_OUTOF10: 6
PAINLEVEL_OUTOF10: 6
PAINLEVEL_OUTOF10: 2
PAINLEVEL_OUTOF10: 8

## 2025-03-13 ASSESSMENT — PATIENT HEALTH QUESTIONNAIRE - PHQ9
2. FEELING DOWN, DEPRESSED OR HOPELESS: NOT AT ALL
SUM OF ALL RESPONSES TO PHQ9 QUESTIONS 1 & 2: 0
1. LITTLE INTEREST OR PLEASURE IN DOING THINGS: NOT AT ALL

## 2025-03-13 ASSESSMENT — PAIN DESCRIPTION - LOCATION: LOCATION: BACK

## 2025-03-13 ASSESSMENT — PAIN DESCRIPTION - ORIENTATION: ORIENTATION: LEFT

## 2025-03-13 NOTE — TELEPHONE ENCOUNTER
Patient mother Ara called stating that the patient is uncontrollably vomiting. She reports lower back and right side pain, unknown fever but is pale with chills. I spoke with Dr. Brown and advised patient should be taken to Triage. Patient mother would still like a call back.

## 2025-03-13 NOTE — H&P
OB Triage H&P    Assessment/Plan    Rochelle Joseph is a 34 y.o.  at 25w0d, LORY: 2025, by Last Menstrual Period, who presents to triage with nausea and vomiting, left flank pain, mild left hydronephrosis and microscopic hematuria.    Plan    -Iv fluids, antiemetics, IV dilaudid times one  -pain now controlled with tylenol  -d/w pt not much evidence to support Flomax  -Pt comfortable with discharge, fu with provider with in a week, sooner if cannot control pain, has fever, worsening symptoms  -strain urine and we can run stone analysis if passes      -Fetal monitoring reassuring  -Good fetal movement  -Up to date on prenatal care  -Continue routine prenatal care      Dispo  -Patient appropriate for discharge home, agrees with plan  -Return precautions discussed           Pregnancy Problems (from 24 to present)       Problem Noted Diagnosed Resolved    History of gestational diabetes in prior pregnancy, currently pregnant ([facility]) 2025 by Corinne A Bazella, MD  No    Priority:  Medium       Overview Signed 2025 11:21 AM by Corinne A Bazella, MD     If she fails her one hour wants to just to go to treatment and GDM diet control as she did in last pregnancy.         Prenatal care, subsequent pregnancy in second trimester ([facility]) 2024 by KIARA Mcguire  No    Priority:  Medium       24 weeks gestation of pregnancy ([facility]) 2024 by KIARA Mcguire  No    Priority:  Medium       Overview Addendum 2025 11:16 AM by Corinne A Bazella, MD     Desired provider in labor: [] CNM  [x] Physician-->repeat c/s x 3  [x] Blood Products: [x] Yes, accepts [] No, needs counseling  [x] Initial BMI: 32.00   [x] Prenatal Labs: wnl  [x] Cervical Cancer Screening up to date: due    [x] Rh status:  O+  [x] NT US: (11-13 wks): 0.9mm wnl  [x] Baby ASA (if indicated): sent   [x] Pregnancy dated by: LMP = 7w1d scan    [x] Anatomy US: (19-20 wks)  []  Federal Sterilization consent signed (if indicated):  [] 1hr GCT at 24-28wks:  [] Rhogam (if indicated):   [] Fetal Surveillance (if indicated):  [] Tdap (27-32 wks, may be given up to 36 wks if initial window missed):   [] RSV (32-36 wks) (Sept. to end ):       [] Breastfeeding:  [] Postpartum Birth control method:   [] GBS at 36 - 37 wks:  [] 39 weeks discussion of IOL vs. Expectant management:  [x] Mode of delivery ( anticipated ):  C/S                 Subjective   35 yo  at 25 weeks presents with abrupt onset of left flank pain and nausea and vomiting.  No fevers but felt flushed.  One episode diarrhea.  No dysuria/hematuria.  No rhinitis/cough/headache.  No hematemesis.  Does not endorse contractions, leakage of fluid or vaginal bleeding.  Endorses good fetal movement.     Prenatal Provider Bobo    OB History    Para Term  AB Living   3 2 2 0 0 2   SAB IAB Ectopic Multiple Live Births   0 0 0 0 2      # Outcome Date GA Lbr Jules/2nd Weight Sex Type Anes PTL Lv   3 Current            2 Term 22 39w0d  3.515 kg F CS-LTranv  N SOWMYA      Complications: Gestational diabetes      Name: Antonia Camara Term 10/03/20 41w0d  3.232 kg F CS-LTranv  N SOWMYA      Complications: Fetal Intolerance, Chorioamnionitis, Gestational diabetes      Name: Della       Past Surgical History:   Procedure Laterality Date     SECTION, LOW TRANSVERSE      TONSILLECTOMY  2018    Tonsillectomy       Social History     Tobacco Use    Smoking status: Never    Smokeless tobacco: Never   Substance Use Topics    Alcohol use: Not Currently     Comment: occ       Allergies   Allergen Reactions    Amoxicillin Hives and Swelling    Cefaclor Hives and Swelling       Medications Prior to Admission   Medication Sig Dispense Refill Last Dose/Taking    aspirin 81 mg chewable tablet Chew 2 tablets (162 mg) once daily. 180 tablet 2 3/13/2025 Morning    cabergoline (Dostinex) 0.5 mg tablet Take 1 tablet (0.5 mg) by  mouth see administration instructions. Take twice a week one week then alternate with taking one tab once a week. 24 tablet 1 Unknown     Objective     Last Vitals  Temp Pulse Resp BP MAP O2 Sat   36.8 °C (98.2 °F) 91 14 110/64 80 98 %     Blood Pressures         3/13/2025  1021 3/13/2025  1335          BP: 129/89 110/64               Physical Exam  General: NAD, mood appropriate  Cardiopulmonary: warm and well perfused, breathing comfortably on room air  Abdomen: Gravid, non-tender  Extremities: Symmetric  Speculum Exam: deferred       Fetal Monitoring  Baseline: 150 bpm, Variability: moderate, AGA ,  NO decelerations  Uterine Activity: No contractions seen on toco        Labs in chart were reviewed.  CBC   Recent Labs     03/13/25  1059   WBC 10.6   HGB 11.7*   HCT 35.0*        CMP   Recent Labs     03/13/25  1059      K 3.8      CO2 25   ANIONGAP 11   BUN 8   CREATININE 0.66   EGFR >90   CALCIUM 8.9   ALBUMIN 3.7   PROT 6.6   ALKPHOS 55   ALT 11   AST 12   BILITOT 0.2      Results from last 7 days   Lab Units 03/13/25  1059   WBC AUTO x10*3/uL 10.6   HEMOGLOBIN g/dL 11.7*   HEMATOCRIT % 35.0*   PLATELETS AUTO x10*3/uL 271   AST U/L 12   ALT U/L 11   CREATININE mg/dL 0.66        Prenatal labs reviewed, not remarkable.

## 2025-03-15 ENCOUNTER — DOCUMENTATION (OUTPATIENT)
Dept: OBSTETRICS AND GYNECOLOGY | Facility: CLINIC | Age: 35
End: 2025-03-15
Payer: COMMERCIAL

## 2025-03-15 NOTE — PROGRESS NOTES
Patient called regarding results from her renal ultrasound.   I discussed that everything I can see is appropriate for pregnancy, she stated that there is a comment about perinephric(?) stranding, however I cannot see this in the report.  We discussed that some technicians make comments that do not make the formal report.     She wanted to know if it was still normal to have pain, we discussed that if she had a kidney stone- this can be very normal.  She was curious about other obstruction, and I stated the imaging I saw did not reflect that.  I told her I could not provide any further reassurance over the phone and without exam, however she is welcome to come be evaluated for persistent pain today.     mIelda Castellanos MD

## 2025-03-17 ENCOUNTER — TELEPHONE (OUTPATIENT)
Dept: OBSTETRICS AND GYNECOLOGY | Facility: CLINIC | Age: 35
End: 2025-03-17

## 2025-03-17 PROBLEM — Z3A.25 25 WEEKS GESTATION OF PREGNANCY (HHS-HCC): Status: ACTIVE | Noted: 2024-11-21

## 2025-03-17 PROCEDURE — 96374 THER/PROPH/DIAG INJ IV PUSH: CPT

## 2025-03-17 NOTE — PROGRESS NOTES
L&D follow up    Ob Follow-up  25     SUBJECTIVE      HPI: Rochelle Joseph is a 34 y.o.  at 25w4d here for RPNV.  Patient reports seen at [facility]- thought to have kidney stone.  Since then, she is feeling better.  The pain was on her left flank and there was blood in her urine.       OBJECTIVE  Visit Vitals  /64   Wt 82.6 kg (182 lb)   LMP 2024   BMI 33.29 kg/m²   OB Status Pregnant   Smoking Status Never   BSA 1.9 m²            ASSESSMENT & PLAN    Rochelle Joseph is a 34 y.o.  at 25w4d here for the following concerns we addressed today:    Problem List Items Addressed This Visit          Ob-Gyn Problems    Previous  section complicating pregnancy ([facility])    Overview     Hx of C/S x 2         Prenatal care, subsequent pregnancy in second trimester ([facility]) - Primary    History of gestational diabetes in prior pregnancy, currently pregnant ([facility])    Overview     If she fails her one hour wants to just to go to treatment and GDM diet control as she did in last pregnancy.         25 weeks gestation of pregnancy ([facility])    Overview     Desired provider in labor: [] CNM  [x] Physician-->repeat c/s x 3  [x] Blood Products: [x] Yes, accepts [] No, needs counseling  [x] Initial BMI: 32.00   [x] Prenatal Labs: wnl  [x] Cervical Cancer Screening up to date: due    [x] Rh status:  O+  [x] NT US: (11-13 wks): 0.9mm wnl  [x] Baby ASA (if indicated): sent   [x] Pregnancy dated by: LMP = 7w1d scan    [x] Anatomy US: (19-20 wks)  [] Federal Sterilization consent signed (if indicated):  [x] 1hr GCT at 24-28wks: if elevated will do blood sugar testing over doing the 3 hour due to history of diet controlled GDM  [] Fetal Surveillance (if indicated):  [] Tdap (27-32 wks, may be given up to 36 wks if initial window missed):       [] Breastfeeding:  [] Postpartum Birth control method:   [] GBS at 36 - 37 wks:  [] Mode of delivery ( anticipated ):  schedule repeat C/S              No  orders of the defined types were placed in this encounter.     Anxiety- will have her see Dr Waldron for a consult.  Kidney stone passed she will monitor.    RTC in 4 weeks      Corinne A Bazella, MD

## 2025-03-19 ENCOUNTER — OFFICE VISIT (OUTPATIENT)
Dept: OBSTETRICS AND GYNECOLOGY | Facility: CLINIC | Age: 35
End: 2025-03-19
Payer: COMMERCIAL

## 2025-03-19 VITALS — DIASTOLIC BLOOD PRESSURE: 64 MMHG | SYSTOLIC BLOOD PRESSURE: 116 MMHG | WEIGHT: 182 LBS | BODY MASS INDEX: 33.29 KG/M2

## 2025-03-19 DIAGNOSIS — Z3A.25 25 WEEKS GESTATION OF PREGNANCY (HHS-HCC): ICD-10-CM

## 2025-03-19 DIAGNOSIS — O34.219 PREVIOUS CESAREAN SECTION COMPLICATING PREGNANCY (HHS-HCC): ICD-10-CM

## 2025-03-19 DIAGNOSIS — O09.299 HISTORY OF GESTATIONAL DIABETES IN PRIOR PREGNANCY, CURRENTLY PREGNANT (HHS-HCC): ICD-10-CM

## 2025-03-19 DIAGNOSIS — Z86.32 HISTORY OF GESTATIONAL DIABETES IN PRIOR PREGNANCY, CURRENTLY PREGNANT (HHS-HCC): ICD-10-CM

## 2025-03-19 DIAGNOSIS — Z34.82 PRENATAL CARE, SUBSEQUENT PREGNANCY IN SECOND TRIMESTER (HHS-HCC): Primary | ICD-10-CM

## 2025-03-19 PROCEDURE — 99213 OFFICE O/P EST LOW 20 MIN: CPT | Performed by: OBSTETRICS & GYNECOLOGY

## 2025-03-26 ENCOUNTER — TELEPHONE (OUTPATIENT)
Dept: OBSTETRICS AND GYNECOLOGY | Facility: CLINIC | Age: 35
End: 2025-03-26
Payer: COMMERCIAL

## 2025-03-26 NOTE — TELEPHONE ENCOUNTER
3/26/25 - Patient has virtual appointment with Dr Waldron 4/9    ----- Message from Sherri Waldron sent at 3/19/2025  9:41 AM EDT -----  Regarding: FW: can you make a plan for this patient's anxiety  Can you schedule her for a consult with me in April? Thanks!  ----- Message -----  From: Corinne A Bazella, MD  Sent: 3/19/2025   9:36 AM EDT  To: Sherri Waldron MD  Subject: can you make a plan for this patient's anxie#    She has pre-pregnancy anxiety and it got so much worse after each of her other children.  Could you do a consult and then make a PP plan and get her connected to resources.  Sending just to you so you can forward to which ever office is best for you and your rn.

## 2025-04-05 ENCOUNTER — LAB (OUTPATIENT)
Dept: LAB | Facility: HOSPITAL | Age: 35
End: 2025-04-05
Payer: COMMERCIAL

## 2025-04-05 LAB
ERYTHROCYTE [DISTWIDTH] IN BLOOD BY AUTOMATED COUNT: 12.8 % (ref 11.5–14.5)
HCT VFR BLD AUTO: 33.6 % (ref 36–46)
HGB BLD-MCNC: 11 G/DL (ref 12–16)
MCH RBC QN AUTO: 29.7 PG (ref 26–34)
MCHC RBC AUTO-ENTMCNC: 32.7 G/DL (ref 32–36)
MCV RBC AUTO: 91 FL (ref 80–100)
NRBC BLD-RTO: 0 /100 WBCS (ref 0–0)
PLATELET # BLD AUTO: 222 X10*3/UL (ref 150–450)
RBC # BLD AUTO: 3.7 X10*6/UL (ref 4–5.2)
WBC # BLD AUTO: 7 X10*3/UL (ref 4.4–11.3)

## 2025-04-05 PROCEDURE — 85027 COMPLETE CBC AUTOMATED: CPT

## 2025-04-06 LAB
GLUCOSE 1H P 50 G GLC PO SERPL-MCNC: 169 MG/DL
REFLEX ADDED, ANEMIA PANEL: NORMAL
T PALLIDUM AB SER QL IA: NORMAL

## 2025-04-08 ENCOUNTER — TELEPHONE (OUTPATIENT)
Dept: OBSTETRICS AND GYNECOLOGY | Facility: CLINIC | Age: 35
End: 2025-04-08
Payer: COMMERCIAL

## 2025-04-08 NOTE — TELEPHONE ENCOUNTER
Pt contacted to provide her with dr cabral's recommendation about her elevated one hour glucose.  Pt states she has already been tracking them and has an appt at the  location on thurs.  Pt plans to ask for refills for lancets and test strips at that time.  Still has plenty at home for now.

## 2025-04-08 NOTE — TELEPHONE ENCOUNTER
----- Message from Corinne Bazella sent at 4/7/2025  6:38 PM EDT -----  Tell her to start checking her blood sugars and I will review at her next appointment.  This happens each pregnancy- she is always diet controlled.  She has a meter- but will need lancets and test strips if you can send that.

## 2025-04-09 ENCOUNTER — APPOINTMENT (OUTPATIENT)
Dept: OBSTETRICS AND GYNECOLOGY | Facility: CLINIC | Age: 35
End: 2025-04-09
Payer: COMMERCIAL

## 2025-04-09 ENCOUNTER — TELEMEDICINE (OUTPATIENT)
Dept: OBSTETRICS AND GYNECOLOGY | Facility: CLINIC | Age: 35
End: 2025-04-09
Payer: COMMERCIAL

## 2025-04-09 DIAGNOSIS — F41.1 GAD (GENERALIZED ANXIETY DISORDER): Primary | ICD-10-CM

## 2025-04-09 PROBLEM — Z3A.29 29 WEEKS GESTATION OF PREGNANCY (HHS-HCC): Status: ACTIVE | Noted: 2024-11-21

## 2025-04-09 PROBLEM — Z34.83 PRENATAL CARE, SUBSEQUENT PREGNANCY IN THIRD TRIMESTER: Status: ACTIVE | Noted: 2024-11-21

## 2025-04-09 PROBLEM — O09.93 HIGH-RISK PREGNANCY, THIRD TRIMESTER (HHS-HCC): Status: ACTIVE | Noted: 2024-11-21

## 2025-04-09 PROBLEM — O24.410 DIET CONTROLLED GESTATIONAL DIABETES MELLITUS (GDM) IN THIRD TRIMESTER (HHS-HCC): Status: ACTIVE | Noted: 2025-04-09

## 2025-04-09 LAB
GLUCOSE 1H P 50 G GLC PO SERPL-MCNC: 169 MG/DL
T PALLIDUM AB SER QL IA: NEGATIVE

## 2025-04-09 PROCEDURE — 99215 OFFICE O/P EST HI 40 MIN: CPT | Mod: TH,95 | Performed by: OBSTETRICS & GYNECOLOGY

## 2025-04-09 PROCEDURE — 1036F TOBACCO NON-USER: CPT | Performed by: OBSTETRICS & GYNECOLOGY

## 2025-04-09 PROCEDURE — 99215 OFFICE O/P EST HI 40 MIN: CPT | Performed by: OBSTETRICS & GYNECOLOGY

## 2025-04-09 NOTE — PROGRESS NOTES
Ob Follow-up  04/10/25     SUBJECTIVE      HPI:   History of Present Illness  The patient, with a history of gestational diabetes and anxiety, recently had a consultation with a psychiatrist, Dr. Waldron. She found the consultation productive and appreciated Dr. Waldron's direct approach.    Regarding her gestational diabetes, the patient has been diligently monitoring her blood sugar levels and reports that she is within the goal range. She has been maintaining a well-balanced diet and has not experienced any significant spikes in her blood sugar levels. The patient has a history of failing the glucose tolerance test but manages her diabetes well with diet control.             OBJECTIVE  Visit Vitals  /72   Wt 84 kg (185 lb 3.2 oz)   LMP 2024   BMI 33.87 kg/m²   OB Status Pregnant   Smoking Status Never   BSA 1.92 m²            ASSESSMENT & PLAN    Rochelle Joseph is a 34 y.o.  at 28w6d here for the following concerns we addressed today:    Problem List Items Addressed This Visit          Ob-Gyn Problems    Previous  section complicating pregnancy ([facility])    Overview     Hx of C/S x 2         High-risk pregnancy, third trimester ([facility]) - Primary    Diet controlled gestational diabetes mellitus (GDM) in third trimester ([facility])    Relevant Medications    blood sugar diagnostic    FreeStyle lancets 28 gauge    Other Relevant Orders    US MAC OB imaging order    29 weeks gestation of pregnancy ([facility])    Overview     Desired provider in labor: [] CNM  [x] Physician-->repeat c/s x 3  [x] Blood Products: [x] Yes, accepts [] No, needs counseling  [x] Initial BMI: 32.00   [x] Prenatal Labs: wnl  [x] Cervical Cancer Screening up to date: due    [x] Rh status:  O+  [x] NT US: (11-13 wks): 0.9mm wnl  [x] Baby ASA (if indicated): sent   [x] Pregnancy dated by: LMP = 7w1d scan    [x] Anatomy US: (19-20 wks)  [] Federal Sterilization consent signed (if indicated):  [x] 1hr GCT at 24-28wks:  if elevated will do blood sugar testing over doing the 3 hour due to history of diet controlled GDM- elevated- will treat like GDM  [] Fetal Surveillance (if indicated):  [] Tdap (27-32 wks, may be given up to 36 wks if initial window missed):       [] Breastfeeding:  [] Postpartum Birth control method:   [] GBS at 36 - 37 wks:  [] Mode of delivery ( anticipated ):  schedule repeat C/S          Other Visit Diagnoses       Abnormal glucose tolerance test in pregnancy, antepartum ([facility])        Relevant Medications    blood sugar diagnostic    FreeStyle lancets 28 gauge              Orders Placed This Encounter   Procedures    US MAC OB imaging order     Standing Status:   Future     Standing Expiration Date:   2026     Order Specific Question:   Reason for exam:     Answer:   Diet controlled GDM- growth US     Order Specific Question:   Radiologist to Determine Optimal Study     Answer:   Yes     Order Specific Question:   Release result to [employer]     Answer:   Immediate [1]     Order Specific Question:   Is this exam part of a Research Study? If Yes, link this order to the research study     Answer:   No      Assessment & Plan  Gestational Diabetes Mellitus (GDM)  Managing GDM with diet, maintaining blood sugar within target. Anticipates good control without insulin. Additional ultrasounds planned for fetal growth monitoring.  - Monitor blood sugar four times daily: fasting and one hour postprandial.  - Refill Freestyle light testing strips and lancets.  - Schedule ultrasounds every three to four weeks for fetal growth monitoring.  - Plan  at 39 weeks, scheduled for 2025.    Anxiety  Experiences anxiety, considering Lexapro and therapy as recommended by Dr. Waldron. Found Dr. Waldron's approach beneficial.  - Follow up with Dr. Waldron for anxiety management and potential medication.  - Continue therapy sessions.       RTC in 2 weeks      Corinne A Bazella, MD
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
## 2025-04-10 ENCOUNTER — PREP FOR PROCEDURE (OUTPATIENT)
Dept: OBSTETRICS AND GYNECOLOGY | Facility: CLINIC | Age: 35
End: 2025-04-10

## 2025-04-10 ENCOUNTER — TELEPHONE (OUTPATIENT)
Dept: OBSTETRICS AND GYNECOLOGY | Facility: CLINIC | Age: 35
End: 2025-04-10

## 2025-04-10 ENCOUNTER — APPOINTMENT (OUTPATIENT)
Dept: OBSTETRICS AND GYNECOLOGY | Facility: CLINIC | Age: 35
End: 2025-04-10
Payer: COMMERCIAL

## 2025-04-10 VITALS — WEIGHT: 185.2 LBS | DIASTOLIC BLOOD PRESSURE: 72 MMHG | BODY MASS INDEX: 33.87 KG/M2 | SYSTOLIC BLOOD PRESSURE: 112 MMHG

## 2025-04-10 DIAGNOSIS — Z3A.29 29 WEEKS GESTATION OF PREGNANCY (HHS-HCC): ICD-10-CM

## 2025-04-10 DIAGNOSIS — O09.93 HIGH-RISK PREGNANCY, THIRD TRIMESTER (HHS-HCC): Primary | ICD-10-CM

## 2025-04-10 DIAGNOSIS — O34.219 UTERINE SCAR FROM PREVIOUS CESAREAN DELIVERY AFFECTING PREGNANCY (HHS-HCC): Primary | ICD-10-CM

## 2025-04-10 DIAGNOSIS — O24.410 DIET CONTROLLED GESTATIONAL DIABETES MELLITUS (GDM) IN THIRD TRIMESTER (HHS-HCC): ICD-10-CM

## 2025-04-10 DIAGNOSIS — O99.810 ABNORMAL GLUCOSE TOLERANCE TEST IN PREGNANCY, ANTEPARTUM (HHS-HCC): ICD-10-CM

## 2025-04-10 DIAGNOSIS — O34.219 PREVIOUS CESAREAN SECTION COMPLICATING PREGNANCY (HHS-HCC): ICD-10-CM

## 2025-04-10 PROCEDURE — 0501F PRENATAL FLOW SHEET: CPT | Performed by: OBSTETRICS & GYNECOLOGY

## 2025-04-10 RX ORDER — LANCETS 28 GAUGE
EACH MISCELLANEOUS
Qty: 200 EACH | Refills: 3 | Status: SHIPPED | OUTPATIENT
Start: 2025-04-10

## 2025-04-10 NOTE — TELEPHONE ENCOUNTER
Subjective:      Patient ID: Deepali Madrid is a 74 y.o. female.    Chief Complaint: Diabetes (3month f/u medication refills)     Diabetes with last A1c of 7.7 worsen from 6.9.  Patient reports home blood sugars are running less than 200 but are running high today.  Patient is missing glimepiride and Trulicity for some time and that can be contributing to high blood sugar.  Patient denies polyuria, polydipsia, numbness in hands or feet.  Patient denies any hypoglycemic episodes.    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  Patient took Eliquis for 6 months and medication was stopped patient was evaluated by hematologist who recommended to continue anticoagulation but patient declined it.  Patient had colonoscopy 3 years ago in 2019 and repeat colonoscopy was recommended in 3 years.  Patient is declining repeat colonoscopy.    Patient has hyperparathyroidism and was followed by endocrinologist but has not seen one in some time.  Patient has osteoporosis and has tried Actonel and Fosamax but both medication cause severe joint pains.  Pains improved after stopping the medication.     Review of Systems   Constitutional:  Negative for chills, diaphoresis, fever, malaise/fatigue and weight loss.   HENT:  Negative for congestion, ear pain, sinus pain, sore throat and tinnitus.    Eyes:  Negative for blurred vision and photophobia.   Respiratory:  Negative for cough, hemoptysis, shortness of breath and wheezing.    Cardiovascular:  Negative for chest pain, palpitations, orthopnea, leg swelling and PND.   Gastrointestinal:  Negative for abdominal pain, blood in stool, constipation, diarrhea, heartburn, melena, nausea and vomiting.   Genitourinary:  Negative for dysuria, frequency and urgency.   Musculoskeletal:  Negative for back pain, myalgias and neck pain.   Skin:  Negative for rash.   Neurological:  Negative for dizziness, tremors, seizures, loss of consciousness and weakness.   Endo/Heme/Allergies:   ----- Message from Corinne Bazella sent at 4/10/2025 11:56 AM EDT -----  Can we please schedule a C/S for gestational diab at June 20 at 10 am slot.  Thank you   "Negative for polydipsia.   Psychiatric/Behavioral:  Negative for depression and hallucinations. The patient does not have insomnia.      Objective:   /82 (BP Location: Left arm, Patient Position: Sitting, BP Method: Medium (Automatic))   Pulse 73   Temp 96.9 °F (36.1 °C) (Temporal)   Resp 16   Ht 5' 8" (1.727 m)   Wt 72.7 kg (160 lb 3.2 oz)   SpO2 (!) 93%   BMI 24.36 kg/m²     Physical Exam  Constitutional:       General: She is not in acute distress.     Appearance: She is not diaphoretic.   Neck:      Thyroid: No thyromegaly.   Cardiovascular:      Rate and Rhythm: Normal rate and regular rhythm.      Heart sounds: Normal heart sounds. No murmur heard.  Pulmonary:      Effort: Pulmonary effort is normal. No respiratory distress.      Breath sounds: Normal breath sounds. No wheezing.   Abdominal:      General: Bowel sounds are normal. There is no distension.      Palpations: Abdomen is soft.      Tenderness: There is no abdominal tenderness.   Lymphadenopathy:      Cervical: No cervical adenopathy.   Neurological:      Mental Status: She is alert and oriented to person, place, and time.   Psychiatric:         Behavior: Behavior normal.         Thought Content: Thought content normal.         Judgment: Judgment normal.         ICD-10-CM ICD-9-CM   1. Post-menopause  Z78.0 V49.81   2. Type 2 diabetes mellitus without complication, without long-term current use of insulin  E11.9 250.00   3. Primary hypertension  I10 401.9   4. Osteoporosis, unspecified osteoporosis type, unspecified pathological fracture presence  M81.0 733.00       Plan:     Patient has type 2 diabetes with last A1c of 7.7 is slightly high  Patient blood sugars are running high secondary to missing glimepiride and Trulicity for last few weeks  Will refill medication  Will repeat labs  Patient has hypertension and blood pressure seem under good control.  Will continue medication  Patient blood pressures are under control with lisinopril.  " Will continue medication  Patient has factor 5 laden deficiency and declines anticoagulation  Repeat colonoscopy was recommended after 3 years.  Patient declines colonoscopy  Patient has osteoporosis.  Will refer for Prolia injection    Medication List with Changes/Refills   New Medications    DENOSUMAB (PROLIA) 60 MG/ML SYRG    Inject 1 mL (60 mg total) into the skin every 6 (six) months.   Current Medications    ASPIRIN (ECOTRIN) 81 MG EC TABLET    aspirin 81 mg tablet,delayed release   Take 1 tablet every other day by oral route.    METFORMIN (GLUCOPHAGE) 1000 MG TABLET    Take 1 tablet (1,000 mg total) by mouth 2 (two) times daily.    PRAVASTATIN (PRAVACHOL) 40 MG TABLET    Take 1 tablet (40 mg total) by mouth once daily.    SITAGLIPTIN PHOSPHATE (JANUVIA) 100 MG TAB    Take 1 tablet (100 mg total) by mouth once daily.   Changed and/or Refilled Medications    Modified Medication Previous Medication    DULAGLUTIDE (TRULICITY) 3 MG/0.5 ML PEN INJECTOR dulaglutide (TRULICITY) 3 mg/0.5 mL pen injector       Inject 3 mg into the skin every 7 days.    Inject 3 mg into the skin every 7 days.    GLIMEPIRIDE (AMARYL) 2 MG TABLET glimepiride (AMARYL) 2 MG tablet       TAKE 2 TABLETS BY MOUTH EVERY MORNING AND 1 TABLET EVERY EVENING    TAKE 2 TABLETS BY MOUTH EVERY MORNING AND 1 TABLET EVERY EVENING    LISINOPRIL (PRINIVIL,ZESTRIL) 20 MG TABLET lisinopriL (PRINIVIL,ZESTRIL) 20 MG tablet       Take 1 tablet (20 mg total) by mouth once daily.    Take 1 tablet (20 mg total) by mouth once daily.

## 2025-04-10 NOTE — TELEPHONE ENCOUNTER
scheduling office contacted.    S/w brad.  Repeat c/s x 3 has been scheduled for 6/20/25 at 10:00am.  Pt will be 39.1 weeks that day.  Prep for procedure will need to be entered by dr cabral.  Pt to be give date/time/c/s letter and lab work instructions closer to OR date.

## 2025-04-11 DIAGNOSIS — O24.410 DIET CONTROLLED GESTATIONAL DIABETES MELLITUS (GDM) IN THIRD TRIMESTER (HHS-HCC): ICD-10-CM

## 2025-04-11 PROBLEM — O34.219 UTERINE SCAR FROM PREVIOUS CESAREAN DELIVERY AFFECTING PREGNANCY (HHS-HCC): Status: ACTIVE | Noted: 2025-04-10

## 2025-04-11 RX ORDER — DEXTROSE 4 G
TABLET,CHEWABLE ORAL
Qty: 1 EACH | Refills: 0 | Status: SHIPPED | OUTPATIENT
Start: 2025-04-11

## 2025-04-11 RX ORDER — BLOOD SUGAR DIAGNOSTIC
STRIP MISCELLANEOUS
Qty: 150 EACH | Refills: 3 | Status: SHIPPED | OUTPATIENT
Start: 2025-04-11

## 2025-04-14 NOTE — PROGRESS NOTES
Contacted pt  Name and  verified  Spoke with pt states she is having a hard time getting supplies to test blood glucose. Said she has previous meter Freestyle and would be ok if the supplies are covered, I will send the PA for the Freestyle lancet and test strips today. Pt has a few left over but running low.  Pt verbalized understanding.  No further questions or concerns at this time.

## 2025-04-21 ENCOUNTER — HOSPITAL ENCOUNTER (OUTPATIENT)
Dept: RADIOLOGY | Facility: HOSPITAL | Age: 35
Discharge: HOME | End: 2025-04-21
Payer: COMMERCIAL

## 2025-04-21 DIAGNOSIS — O20.0 THREATENED MISCARRIAGE IN EARLY PREGNANCY (HHS-HCC): ICD-10-CM

## 2025-04-21 PROCEDURE — 76816 OB US FOLLOW-UP PER FETUS: CPT | Performed by: OBSTETRICS & GYNECOLOGY

## 2025-04-21 PROCEDURE — 76819 FETAL BIOPHYS PROFIL W/O NST: CPT | Performed by: OBSTETRICS & GYNECOLOGY

## 2025-04-21 PROCEDURE — 76819 FETAL BIOPHYS PROFIL W/O NST: CPT

## 2025-04-21 PROCEDURE — 76816 OB US FOLLOW-UP PER FETUS: CPT

## 2025-04-22 PROBLEM — Z3A.30 30 WEEKS GESTATION OF PREGNANCY (HHS-HCC): Status: ACTIVE | Noted: 2024-11-21

## 2025-04-22 NOTE — PROGRESS NOTES
TDAP injected today   EPDS = 12 / Pt is seeing someone       Ob Follow-up  25     SUBJECTIVE      HPI: Rochelle Joseph is a 34 y.o.  at 30w5d here for RPNV.  BS are in range    OBJECTIVE  Visit Vitals  /72   Wt 84.4 kg (186 lb)   LMP 2024   BMI 34.02 kg/m²   OB Status Pregnant   Smoking Status Never   BSA 1.92 m²            ASSESSMENT & PLAN    Rochelle Joseph is a 34 y.o.  at 30w5d here for the following concerns we addressed today:    Problem List Items Addressed This Visit          Ob-Gyn Problems    Uterine scar from previous  delivery affecting pregnancy ([facility])    Relevant Orders    CBC and Auto Differential    High-risk pregnancy, third trimester ([facility]) - Primary    Relevant Orders    CBC and Auto Differential    Diet controlled gestational diabetes mellitus (GDM) in third trimester ([facility])    30 weeks gestation of pregnancy ([facility])    Overview   Desired provider in labor: [] CNM  [x] Physician-->repeat c/s x 3  [x] Blood Products: [x] Yes, accepts [] No, needs counseling  [x] Initial BMI: 32.00   [x] Prenatal Labs: wnl  [x] Cervical Cancer Screening up to date: due    [x] Rh status:  O+  [x] NT US: (11-13 wks): 0.9mm wnl  [x] Baby ASA (if indicated): sent   [x] Pregnancy dated by: LMP = 7w1d scan    [x] Anatomy US: (19-20 wks)  [] Federal Sterilization consent signed (if indicated):  [x] 1hr GCT at 24-28wks: if elevated will do blood sugar testing over doing the 3 hour due to history of diet controlled GDM- elevated- will treat like GDM  [] Fetal Surveillance (if indicated):  [x] Tdap (27-32 wks, may be given up to 36 wks if initial window missed):       [x] Breastfeeding: none  [x] Postpartum Birth control method: maybe OCPs- will discuss with endocrine in management of the prolactinoma  [] GBS at 36 - 37 wks:  [x] Mode of delivery ( anticipated ):  schedule repeat C/S          Other Visit Diagnoses         Need for Tdap vaccination        Relevant  Orders    Tdap vaccine, age 7 years and older  (BOOSTRIX)      Prolactinoma (Multi)        Relevant Orders    Referral to Endocrinology              Orders Placed This Encounter   Procedures    Tdap vaccine, age 7 years and older  (BOOSTRIX)    CBC and Auto Differential     Standing Status:   Future     Expected Date:   6/18/2025     Expiration Date:   4/23/2026     Release result to [facility]:   Immediate [1]    Referral to Endocrinology     Standing Status:   Future     Expected Date:   4/23/2025     Expiration Date:   4/23/2026     Referral Priority:   Routine     Referral Type:   Consultation     Referral Reason:   Specialty Services Required     Requested Specialty:   Endocrinology     Number of Visits Requested:   1        RTC in 2 weeks      Corinne A Bazella, MD

## 2025-04-23 ENCOUNTER — APPOINTMENT (OUTPATIENT)
Dept: OBSTETRICS AND GYNECOLOGY | Facility: CLINIC | Age: 35
End: 2025-04-23
Payer: COMMERCIAL

## 2025-04-23 VITALS — BODY MASS INDEX: 34.02 KG/M2 | WEIGHT: 186 LBS | SYSTOLIC BLOOD PRESSURE: 122 MMHG | DIASTOLIC BLOOD PRESSURE: 72 MMHG

## 2025-04-23 DIAGNOSIS — O34.219 UTERINE SCAR FROM PREVIOUS CESAREAN DELIVERY AFFECTING PREGNANCY (HHS-HCC): ICD-10-CM

## 2025-04-23 DIAGNOSIS — Z23 NEED FOR TDAP VACCINATION: ICD-10-CM

## 2025-04-23 DIAGNOSIS — D35.2 PROLACTINOMA (MULTI): ICD-10-CM

## 2025-04-23 DIAGNOSIS — O09.93 HIGH-RISK PREGNANCY, THIRD TRIMESTER (HHS-HCC): Primary | ICD-10-CM

## 2025-04-23 DIAGNOSIS — Z3A.30 30 WEEKS GESTATION OF PREGNANCY (HHS-HCC): ICD-10-CM

## 2025-04-23 DIAGNOSIS — O24.410 DIET CONTROLLED GESTATIONAL DIABETES MELLITUS (GDM) IN THIRD TRIMESTER (HHS-HCC): ICD-10-CM

## 2025-04-23 PROCEDURE — 90715 TDAP VACCINE 7 YRS/> IM: CPT | Performed by: OBSTETRICS & GYNECOLOGY

## 2025-04-23 PROCEDURE — 0501F PRENATAL FLOW SHEET: CPT | Performed by: OBSTETRICS & GYNECOLOGY

## 2025-04-23 PROCEDURE — 90471 IMMUNIZATION ADMIN: CPT | Performed by: OBSTETRICS & GYNECOLOGY

## 2025-04-23 ASSESSMENT — EDINBURGH POSTNATAL DEPRESSION SCALE (EPDS)
THINGS HAVE BEEN GETTING ON TOP OF ME: YES, SOMETIMES I HAVEN'T BEEN COPING AS WELL AS USUAL
I HAVE LOOKED FORWARD WITH ENJOYMENT TO THINGS: AS MUCH AS I EVER DID
I HAVE FELT SCARED OR PANICKY FOR NO GOOD REASON: YES, SOMETIMES
I HAVE BEEN SO UNHAPPY THAT I HAVE HAD DIFFICULTY SLEEPING: NOT VERY OFTEN
THE THOUGHT OF HARMING MYSELF HAS OCCURRED TO ME: NEVER
TOTAL SCORE: 12
I HAVE BEEN ANXIOUS OR WORRIED FOR NO GOOD REASON: YES, VERY OFTEN
I HAVE BEEN SO UNHAPPY THAT I HAVE BEEN CRYING: ONLY OCCASIONALLY
I HAVE FELT SAD OR MISERABLE: NOT VERY OFTEN
I HAVE BLAMED MYSELF UNNECESSARILY WHEN THINGS WENT WRONG: YES, SOME OF THE TIME
I HAVE BEEN ABLE TO LAUGH AND SEE THE FUNNY SIDE OF THINGS: AS MUCH AS I ALWAYS COULD

## 2025-05-07 ENCOUNTER — APPOINTMENT (OUTPATIENT)
Dept: OBSTETRICS AND GYNECOLOGY | Facility: CLINIC | Age: 35
End: 2025-05-07
Payer: COMMERCIAL

## 2025-05-07 VITALS — DIASTOLIC BLOOD PRESSURE: 80 MMHG | SYSTOLIC BLOOD PRESSURE: 116 MMHG | BODY MASS INDEX: 34.39 KG/M2 | WEIGHT: 188 LBS

## 2025-05-07 DIAGNOSIS — O34.219 UTERINE SCAR FROM PREVIOUS CESAREAN DELIVERY AFFECTING PREGNANCY (HHS-HCC): ICD-10-CM

## 2025-05-07 DIAGNOSIS — O34.219 PREVIOUS CESAREAN SECTION COMPLICATING PREGNANCY (HHS-HCC): ICD-10-CM

## 2025-05-07 DIAGNOSIS — O24.410 DIET CONTROLLED GESTATIONAL DIABETES MELLITUS (GDM) IN THIRD TRIMESTER (HHS-HCC): ICD-10-CM

## 2025-05-07 DIAGNOSIS — O09.93 HIGH-RISK PREGNANCY, THIRD TRIMESTER (HHS-HCC): Primary | ICD-10-CM

## 2025-05-07 DIAGNOSIS — Z3A.32 32 WEEKS GESTATION OF PREGNANCY (HHS-HCC): ICD-10-CM

## 2025-05-07 PROCEDURE — 0501F PRENATAL FLOW SHEET: CPT | Performed by: OBSTETRICS & GYNECOLOGY

## 2025-05-07 NOTE — PROGRESS NOTES
Ob Follow-up  25     SUBJECTIVE      HPI: Rochelle Joseph is a 34 y.o.  at 32 week here for RPNV.  BS are in range    OBJECTIVE  Visit Vitals  /80   Wt 85.3 kg (188 lb)   LMP 2024   BMI 34.39 kg/m²   OB Status Pregnant   Smoking Status Never   BSA 1.93 m²            ASSESSMENT & PLAN    Rochelle Joseph is a 34 y.o.  at 30w5d here for the following concerns we addressed today:    Problem List Items Addressed This Visit          Ob-Gyn Problems    Uterine scar from previous  delivery affecting pregnancy ([facility])    Previous  section complicating pregnancy ([facility])    Overview   Hx of C/S x 2         High-risk pregnancy, third trimester ([facility]) - Primary    Diet controlled gestational diabetes mellitus (GDM) in third trimester ([facility])    32 weeks gestation of pregnancy ([facility])    Overview   Desired provider in labor: [] CNM  [x] Physician-->repeat c/s x 3  [x] Blood Products: [x] Yes, accepts [] No, needs counseling  [x] Initial BMI: 32.00   [x] Prenatal Labs: wnl  [x] Cervical Cancer Screening up to date: due    [x] Rh status:  O+  [x] NT US: (11-13 wks): 0.9mm wnl  [x] Baby ASA (if indicated): sent   [x] Pregnancy dated by: LMP = 7w1d scan    [x] Anatomy US: (19-20 wks)  [] Federal Sterilization consent signed (if indicated):  [x] 1hr GCT at 24-28wks: if elevated will do blood sugar testing over doing the 3 hour due to history of diet controlled GDM- elevated- will treat like GDM  [] Fetal Surveillance (if indicated):  [x] Tdap (27-32 wks, may be given up to 36 wks if initial window missed):       [x] Breastfeeding: none  [x] Postpartum Birth control method: maybe OCPs- will discuss with endocrine in management of the prolactinoma  [] GBS at 36 - 37 wks:  [x] Mode of delivery ( anticipated ):  schedule repeat C/S                No orders of the defined types were placed in this encounter.       RTC in 2 weeks      Corinne A Bazella, MD

## 2025-05-20 PROBLEM — Z3A.34 34 WEEKS GESTATION OF PREGNANCY (HHS-HCC): Status: ACTIVE | Noted: 2024-11-21

## 2025-05-21 ENCOUNTER — APPOINTMENT (OUTPATIENT)
Dept: OBSTETRICS AND GYNECOLOGY | Facility: CLINIC | Age: 35
End: 2025-05-21
Payer: COMMERCIAL

## 2025-05-21 VITALS — BODY MASS INDEX: 34.02 KG/M2 | WEIGHT: 186 LBS | SYSTOLIC BLOOD PRESSURE: 122 MMHG | DIASTOLIC BLOOD PRESSURE: 80 MMHG

## 2025-05-21 DIAGNOSIS — O24.410 DIET CONTROLLED GESTATIONAL DIABETES MELLITUS (GDM) IN THIRD TRIMESTER (HHS-HCC): ICD-10-CM

## 2025-05-21 DIAGNOSIS — O09.93 HIGH-RISK PREGNANCY, THIRD TRIMESTER (HHS-HCC): ICD-10-CM

## 2025-05-21 DIAGNOSIS — Z3A.34 34 WEEKS GESTATION OF PREGNANCY (HHS-HCC): ICD-10-CM

## 2025-05-21 DIAGNOSIS — O34.219 UTERINE SCAR FROM PREVIOUS CESAREAN DELIVERY AFFECTING PREGNANCY (HHS-HCC): ICD-10-CM

## 2025-05-21 DIAGNOSIS — O09.93 HIGH-RISK PREGNANCY, THIRD TRIMESTER (HHS-HCC): Primary | ICD-10-CM

## 2025-05-21 PROCEDURE — 0501F PRENATAL FLOW SHEET: CPT | Performed by: OBSTETRICS & GYNECOLOGY

## 2025-05-30 ENCOUNTER — HOSPITAL ENCOUNTER (OUTPATIENT)
Dept: RADIOLOGY | Facility: CLINIC | Age: 35
Discharge: HOME | End: 2025-05-30
Payer: COMMERCIAL

## 2025-05-30 DIAGNOSIS — O20.0 THREATENED MISCARRIAGE IN EARLY PREGNANCY (HHS-HCC): ICD-10-CM

## 2025-05-30 PROCEDURE — 76816 OB US FOLLOW-UP PER FETUS: CPT

## 2025-05-30 PROCEDURE — 76819 FETAL BIOPHYS PROFIL W/O NST: CPT

## 2025-06-04 ENCOUNTER — APPOINTMENT (OUTPATIENT)
Dept: OBSTETRICS AND GYNECOLOGY | Facility: CLINIC | Age: 35
End: 2025-06-04
Payer: COMMERCIAL

## 2025-06-04 VITALS — DIASTOLIC BLOOD PRESSURE: 70 MMHG | SYSTOLIC BLOOD PRESSURE: 110 MMHG | WEIGHT: 187 LBS | BODY MASS INDEX: 34.2 KG/M2

## 2025-06-04 DIAGNOSIS — O34.219 PREVIOUS CESAREAN SECTION COMPLICATING PREGNANCY (HHS-HCC): ICD-10-CM

## 2025-06-04 DIAGNOSIS — O34.219 UTERINE SCAR FROM PREVIOUS CESAREAN DELIVERY AFFECTING PREGNANCY (HHS-HCC): ICD-10-CM

## 2025-06-04 DIAGNOSIS — Z3A.36 36 WEEKS GESTATION OF PREGNANCY (HHS-HCC): ICD-10-CM

## 2025-06-04 DIAGNOSIS — O24.410 DIET CONTROLLED GESTATIONAL DIABETES MELLITUS (GDM) IN THIRD TRIMESTER (HHS-HCC): ICD-10-CM

## 2025-06-04 DIAGNOSIS — O09.93 HIGH-RISK PREGNANCY, THIRD TRIMESTER (HHS-HCC): Primary | ICD-10-CM

## 2025-06-04 PROCEDURE — 0501F PRENATAL FLOW SHEET: CPT | Performed by: OBSTETRICS & GYNECOLOGY

## 2025-06-04 NOTE — PROGRESS NOTES
GBS swab done today  Pt has an allergy to amoxicillin. Denies PCN allergy  Ob Follow-up  25     SUBJECTIVE      HPI: Rochelle Joseph is a 34 y.o.  at 36w6d here for RPNV.        OBJECTIVE  Visit Vitals  /70   Wt 84.8 kg (187 lb)   LMP 2024   BMI 34.20 kg/m²   OB Status Pregnant   Smoking Status Never   BSA 1.93 m²      ASSESSMENT & PLAN    Rochelle Joseph is a 34 y.o.  at 36w6d here for the following concerns we addressed today:    Problem List Items Addressed This Visit          Ob-Gyn Problems    Uterine scar from previous  delivery affecting pregnancy ([facility])    Previous  section complicating pregnancy ([facility])    Overview   Hx of C/S x 2         High-risk pregnancy, third trimester ([facility]) - Primary    Relevant Orders    Group B Streptococcus (GBS) Prenatal Screen, Culture    QUEST CULTURE, GROUP B STREP WITH SUSCEPTIBILITY    Diet controlled gestational diabetes mellitus (GDM) in third trimester ([facility])    36 weeks gestation of pregnancy ([facility])    Overview   Desired provider in labor: [] CNM  [x] Physician-->repeat c/s x 3  [x] Blood Products: [x] Yes, accepts [] No, needs counseling  [x] Initial BMI: 32.00   [x] Prenatal Labs: wnl  [x] Cervical Cancer Screening up to date: due    [x] Rh status:  O+  [x] NT US: (11-13 wks): 0.9mm wnl  [x] Baby ASA (if indicated): sent   [x] Pregnancy dated by: LMP = 7w1d scan    [x] Anatomy US: (19-20 wks)  [] Federal Sterilization consent signed (if indicated):  [x] 1hr GCT at 24-28wks: if elevated will do blood sugar testing over doing the 3 hour due to history of diet controlled GDM- elevated- will treat like GDM  [] Fetal Surveillance (if indicated):  [x] Tdap (27-32 wks, may be given up to 36 wks if initial window missed):       [x] Breastfeeding: none  [x] Postpartum Birth control method: maybe OCPs- will discuss with endocrine in management of the prolactinoma  [x] GBS at 36 - 37 wks: 25  [x] Mode  of delivery ( anticipated ):  schedule repeat C/S         Relevant Orders    Group B Streptococcus (GBS) Prenatal Screen, Culture    QUEST CULTURE, GROUP B STREP WITH SUSCEPTIBILITY         Orders Placed This Encounter   Procedures    Group B Streptococcus (GBS) Prenatal Screen, Culture     Does the patient have a Penicillin allergy?:   No     SOURCE::   Vaginal/Rectal     Release result to MyChart:   Immediate [1]    QUEST CULTURE, GROUP B STREP WITH SUSCEPTIBILITY     SOURCE::   Vaginal/Rectal     Release result to MyChart:   Immediate [1]        RTC in 1 weeks      Corinne A Bazella, MD

## 2025-06-07 LAB — GP B STREP SPEC QL CULT: NORMAL

## 2025-06-11 ENCOUNTER — TELEPHONE (OUTPATIENT)
Dept: OBSTETRICS AND GYNECOLOGY | Facility: CLINIC | Age: 35
End: 2025-06-11

## 2025-06-11 ENCOUNTER — APPOINTMENT (OUTPATIENT)
Dept: OBSTETRICS AND GYNECOLOGY | Facility: CLINIC | Age: 35
End: 2025-06-11
Payer: COMMERCIAL

## 2025-06-11 VITALS — SYSTOLIC BLOOD PRESSURE: 116 MMHG | WEIGHT: 189 LBS | DIASTOLIC BLOOD PRESSURE: 70 MMHG | BODY MASS INDEX: 34.57 KG/M2

## 2025-06-11 DIAGNOSIS — O34.219 PREVIOUS CESAREAN SECTION COMPLICATING PREGNANCY (HHS-HCC): ICD-10-CM

## 2025-06-11 DIAGNOSIS — O09.93 HIGH-RISK PREGNANCY, THIRD TRIMESTER (HHS-HCC): Primary | ICD-10-CM

## 2025-06-11 DIAGNOSIS — O24.410 DIET CONTROLLED GESTATIONAL DIABETES MELLITUS (GDM) IN THIRD TRIMESTER (HHS-HCC): ICD-10-CM

## 2025-06-11 DIAGNOSIS — Z3A.37 37 WEEKS GESTATION OF PREGNANCY (HHS-HCC): ICD-10-CM

## 2025-06-11 DIAGNOSIS — O34.219 UTERINE SCAR FROM PREVIOUS CESAREAN DELIVERY AFFECTING PREGNANCY (HHS-HCC): ICD-10-CM

## 2025-06-11 PROCEDURE — 0501F PRENATAL FLOW SHEET: CPT | Performed by: OBSTETRICS & GYNECOLOGY

## 2025-06-11 NOTE — TELEPHONE ENCOUNTER
Office received faxed paperwork from [email or web address] today for her leave after baby is born.  Pt has planned c/s on 6/20/25.  Paperwork signed by provider today and faxed to [phone]

## 2025-06-11 NOTE — PROGRESS NOTES
Ob Follow-up  25     SUBJECTIVE      HPI: Rochelle Joseph is a 34 y.o.  at 37w6d here for RPNV.  Reports good normal fetal movement. Patient reports all BS in goal.       OBJECTIVE  Visit Vitals  /70   Wt 85.7 kg (189 lb)   LMP 2024   BMI 34.57 kg/m²   OB Status Pregnant   Smoking Status Never   BSA 1.94 m²       ASSESSMENT & PLAN    Rochelle Joseph is a 34 y.o.  at 37w6d here for the following concerns we addressed today:    Problem List Items Addressed This Visit          Ob-Gyn Problems    Uterine scar from previous  delivery affecting pregnancy ([facility])    Previous  section complicating pregnancy ([facility])    Overview   Hx of C/S x 2         High-risk pregnancy, third trimester ([facility]) - Primary    Diet controlled gestational diabetes mellitus (GDM) in third trimester ([facility])    37 weeks gestation of pregnancy ([facility])    Overview   Desired provider in labor: [] CNM  [x] Physician-->repeat c/s x 3  [x] Blood Products: [x] Yes, accepts [] No, needs counseling  [x] Initial BMI: 32.00   [x] Prenatal Labs: wnl  [x] Cervical Cancer Screening up to date: due    [x] Rh status:  O+  [x] NT US: (11-13 wks): 0.9mm wnl  [x] Baby ASA (if indicated): sent   [x] Pregnancy dated by: LMP = 7w1d scan    [x] Anatomy US: (19-20 wks)  [x] Federal Sterilization consent signed (if indicated): not indicated  [x] 1hr GCT at 24-28wks: if elevated will do blood sugar testing over doing the 3 hour due to history of diet controlled GDM- elevated- will treat like GDM  [x] Fetal Surveillance (if indicated): not indicated  [x] Tdap (27-32 wks, may be given up to 36 wks if initial window missed):       [x] Breastfeeding: none  [x] Postpartum Birth control method: maybe OCPs- will discuss with endocrine in management of the prolactinoma  [x] GBS at 36 - 37 wks: 25  [x] Mode of delivery ( anticipated ):  schedule repeat C/S              No orders of the defined types were  placed in this encounter.       RTC in 1 weeks      Corinne A Bazella, MD

## 2025-06-17 PROBLEM — Z3A.38 38 WEEKS GESTATION OF PREGNANCY (HHS-HCC): Status: ACTIVE | Noted: 2024-11-21

## 2025-06-18 ENCOUNTER — APPOINTMENT (OUTPATIENT)
Dept: LAB | Facility: HOSPITAL | Age: 35
End: 2025-06-18
Payer: COMMERCIAL

## 2025-06-18 ENCOUNTER — APPOINTMENT (OUTPATIENT)
Dept: OBSTETRICS AND GYNECOLOGY | Facility: CLINIC | Age: 35
End: 2025-06-18
Payer: COMMERCIAL

## 2025-06-18 VITALS — DIASTOLIC BLOOD PRESSURE: 70 MMHG | BODY MASS INDEX: 34.93 KG/M2 | WEIGHT: 191 LBS | SYSTOLIC BLOOD PRESSURE: 106 MMHG

## 2025-06-18 DIAGNOSIS — Z3A.38 38 WEEKS GESTATION OF PREGNANCY (HHS-HCC): ICD-10-CM

## 2025-06-18 DIAGNOSIS — O34.219 UTERINE SCAR FROM PREVIOUS CESAREAN DELIVERY AFFECTING PREGNANCY (HHS-HCC): ICD-10-CM

## 2025-06-18 DIAGNOSIS — O34.219 PREVIOUS CESAREAN SECTION COMPLICATING PREGNANCY (HHS-HCC): Primary | ICD-10-CM

## 2025-06-18 DIAGNOSIS — O09.93 HIGH-RISK PREGNANCY, THIRD TRIMESTER (HHS-HCC): ICD-10-CM

## 2025-06-18 DIAGNOSIS — F41.9 ANXIETY: ICD-10-CM

## 2025-06-18 DIAGNOSIS — O24.410 DIET CONTROLLED GESTATIONAL DIABETES MELLITUS (GDM) IN THIRD TRIMESTER (HHS-HCC): ICD-10-CM

## 2025-06-18 LAB
ABO GROUP (TYPE) IN BLOOD: NORMAL
ANTIBODY SCREEN: NORMAL
ERYTHROCYTE [DISTWIDTH] IN BLOOD BY AUTOMATED COUNT: 12.8 % (ref 11–15)
HCT VFR BLD AUTO: 35.9 % (ref 35–45)
HGB BLD-MCNC: 11.4 G/DL (ref 11.7–15.5)
MCH RBC QN AUTO: 28.6 PG (ref 27–33)
MCHC RBC AUTO-ENTMCNC: 31.8 G/DL (ref 32–36)
MCV RBC AUTO: 90.2 FL (ref 80–100)
PLATELET # BLD AUTO: 248 THOUSAND/UL (ref 140–400)
PMV BLD REES-ECKER: 11 FL (ref 7.5–12.5)
RBC # BLD AUTO: 3.98 MILLION/UL (ref 3.8–5.1)
RH FACTOR (ANTIGEN D): NORMAL
WBC # BLD AUTO: 7.9 THOUSAND/UL (ref 3.8–10.8)

## 2025-06-18 PROCEDURE — 86901 BLOOD TYPING SEROLOGIC RH(D): CPT

## 2025-06-18 PROCEDURE — 0501F PRENATAL FLOW SHEET: CPT | Performed by: OBSTETRICS & GYNECOLOGY

## 2025-06-18 PROCEDURE — 86850 RBC ANTIBODY SCREEN: CPT

## 2025-06-18 PROCEDURE — 86900 BLOOD TYPING SEROLOGIC ABO: CPT

## 2025-06-18 NOTE — PROGRESS NOTES
Ob Follow-up  25     SUBJECTIVE      HPI: Rochelle Joseph is a 34 y.o.  at 38w6d here for RPNV.   BS are all in goal.    OBJECTIVE  Visit Vitals  /70   Wt 86.6 kg (191 lb)   LMP 2024   BMI 34.93 kg/m²   OB Status Pregnant   Smoking Status Never   BSA 1.95 m²            ASSESSMENT & PLAN    Rochelle Joseph is a 34 y.o.  at 38w6d here for the following concerns we addressed today:    Problem List Items Addressed This Visit          Ob-Gyn Problems    Uterine scar from previous  delivery affecting pregnancy ([facility])    Previous  section complicating pregnancy ([facility]) - Primary    Overview   Hx of C/S x 2         Diet controlled gestational diabetes mellitus (GDM) in third trimester ([facility])    38 weeks gestation of pregnancy ([facility])    Overview   Desired provider in labor: [] CNM  [x] Physician-->repeat c/s x 3  [x] Blood Products: [x] Yes, accepts [] No, needs counseling  [x] Initial BMI: 32.00   [x] Prenatal Labs: wnl  [x] Cervical Cancer Screening up to date: due    [x] Rh status:  O+  [x] NT US: (11-13 wks): 0.9mm wnl  [x] Baby ASA (if indicated): sent   [x] Pregnancy dated by: LMP = 7w1d scan    [x] Anatomy US: (19-20 wks)  [x] Federal Sterilization consent signed (if indicated): not indicated  [x] 1hr GCT at 24-28wks: if elevated will do blood sugar testing over doing the 3 hour due to history of diet controlled GDM- elevated- will treat like GDM  [x] Fetal Surveillance (if indicated): not indicated  [x] Tdap (27-32 wks, may be given up to 36 wks if initial window missed):       [x] Breastfeeding: none  [x] Postpartum Birth control method: maybe OCPs- will discuss with endocrine in management of the prolactinoma  [x] GBS at 36 - 37 wks: 25  [x] Mode of delivery ( anticipated ):  schedule repeat C/S              No orders of the defined types were placed in this encounter.         Corinne A Bazella, MD

## 2025-06-20 ENCOUNTER — ANESTHESIA EVENT (OUTPATIENT)
Dept: OBSTETRICS AND GYNECOLOGY | Facility: HOSPITAL | Age: 35
End: 2025-06-20
Payer: COMMERCIAL

## 2025-06-20 ENCOUNTER — ANESTHESIA (OUTPATIENT)
Dept: OBSTETRICS AND GYNECOLOGY | Facility: HOSPITAL | Age: 35
End: 2025-06-20
Payer: COMMERCIAL

## 2025-06-20 ENCOUNTER — HOSPITAL ENCOUNTER (INPATIENT)
Facility: HOSPITAL | Age: 35
LOS: 2 days | Discharge: HOME | End: 2025-06-22
Attending: OBSTETRICS & GYNECOLOGY | Admitting: OBSTETRICS & GYNECOLOGY
Payer: COMMERCIAL

## 2025-06-20 PROBLEM — Z3A.39 39 WEEKS GESTATION OF PREGNANCY (HHS-HCC): Status: ACTIVE | Noted: 2025-06-20

## 2025-06-20 LAB
ABO GROUP (TYPE) IN BLOOD: NORMAL
ANTIBODY SCREEN: NORMAL
ERYTHROCYTE [DISTWIDTH] IN BLOOD BY AUTOMATED COUNT: 13.2 % (ref 11.5–14.5)
GLUCOSE BLD MANUAL STRIP-MCNC: 95 MG/DL (ref 74–99)
HCT VFR BLD AUTO: 35.7 % (ref 36–46)
HGB BLD-MCNC: 11.1 G/DL (ref 12–16)
MCH RBC QN AUTO: 28 PG (ref 26–34)
MCHC RBC AUTO-ENTMCNC: 31.1 G/DL (ref 32–36)
MCV RBC AUTO: 90 FL (ref 80–100)
NRBC BLD-RTO: 0 /100 WBCS (ref 0–0)
PLATELET # BLD AUTO: 258 X10*3/UL (ref 150–450)
RBC # BLD AUTO: 3.96 X10*6/UL (ref 4–5.2)
RH FACTOR (ANTIGEN D): NORMAL
TREPONEMA PALLIDUM IGG+IGM AB [PRESENCE] IN SERUM OR PLASMA BY IMMUNOASSAY: NONREACTIVE
WBC # BLD AUTO: 8.6 X10*3/UL (ref 4.4–11.3)

## 2025-06-20 PROCEDURE — 3700000014 HC AN EPIDURAL BLOCK CHARGE: Performed by: OBSTETRICS & GYNECOLOGY

## 2025-06-20 PROCEDURE — 7100000016 HC LABOR RECOVERY PER HOUR

## 2025-06-20 PROCEDURE — 82947 ASSAY GLUCOSE BLOOD QUANT: CPT

## 2025-06-20 PROCEDURE — 36415 COLL VENOUS BLD VENIPUNCTURE: CPT | Performed by: OBSTETRICS & GYNECOLOGY

## 2025-06-20 PROCEDURE — 85027 COMPLETE CBC AUTOMATED: CPT

## 2025-06-20 PROCEDURE — 2500000004 HC RX 250 GENERAL PHARMACY W/ HCPCS (ALT 636 FOR OP/ED)

## 2025-06-20 PROCEDURE — 7100000016 HC LABOR RECOVERY PER HOUR: Performed by: OBSTETRICS & GYNECOLOGY

## 2025-06-20 PROCEDURE — 59514 CESAREAN DELIVERY ONLY: CPT | Performed by: OBSTETRICS & GYNECOLOGY

## 2025-06-20 PROCEDURE — 2500000001 HC RX 250 WO HCPCS SELF ADMINISTERED DRUGS (ALT 637 FOR MEDICARE OP)

## 2025-06-20 PROCEDURE — 36415 COLL VENOUS BLD VENIPUNCTURE: CPT

## 2025-06-20 PROCEDURE — 2500000004 HC RX 250 GENERAL PHARMACY W/ HCPCS (ALT 636 FOR OP/ED): Mod: JZ,TB

## 2025-06-20 PROCEDURE — 86850 RBC ANTIBODY SCREEN: CPT | Performed by: OBSTETRICS & GYNECOLOGY

## 2025-06-20 PROCEDURE — 59510 CESAREAN DELIVERY: CPT

## 2025-06-20 PROCEDURE — 2720000007 HC OR 272 NO HCPCS

## 2025-06-20 PROCEDURE — 1100000001 HC PRIVATE ROOM DAILY

## 2025-06-20 PROCEDURE — 2720000007 HC OR 272 NO HCPCS: Performed by: OBSTETRICS & GYNECOLOGY

## 2025-06-20 PROCEDURE — 86780 TREPONEMA PALLIDUM: CPT | Performed by: OBSTETRICS & GYNECOLOGY

## 2025-06-20 RX ORDER — ACETAMINOPHEN 120 MG/1
SUPPOSITORY RECTAL AS NEEDED
Status: DISCONTINUED | OUTPATIENT
Start: 2025-06-20 | End: 2025-06-20

## 2025-06-20 RX ORDER — TRANEXAMIC ACID 1 G/10ML
1000 INJECTION, SOLUTION INTRAVENOUS ONCE AS NEEDED
Status: DISCONTINUED | OUTPATIENT
Start: 2025-06-20 | End: 2025-06-20

## 2025-06-20 RX ORDER — OXYTOCIN 10 [USP'U]/ML
10 INJECTION, SOLUTION INTRAMUSCULAR; INTRAVENOUS ONCE AS NEEDED
Status: DISCONTINUED | OUTPATIENT
Start: 2025-06-20 | End: 2025-06-20

## 2025-06-20 RX ORDER — NALOXONE HYDROCHLORIDE 0.4 MG/ML
0.1 INJECTION, SOLUTION INTRAMUSCULAR; INTRAVENOUS; SUBCUTANEOUS EVERY 5 MIN PRN
Status: DISCONTINUED | OUTPATIENT
Start: 2025-06-20 | End: 2025-06-22 | Stop reason: HOSPADM

## 2025-06-20 RX ORDER — OXYCODONE HYDROCHLORIDE 5 MG/1
5 TABLET ORAL EVERY 4 HOURS PRN
Status: DISCONTINUED | OUTPATIENT
Start: 2025-06-21 | End: 2025-06-22 | Stop reason: HOSPADM

## 2025-06-20 RX ORDER — OXYTOCIN 10 [USP'U]/ML
10 INJECTION, SOLUTION INTRAMUSCULAR; INTRAVENOUS ONCE AS NEEDED
Status: DISCONTINUED | OUTPATIENT
Start: 2025-06-20 | End: 2025-06-22 | Stop reason: HOSPADM

## 2025-06-20 RX ORDER — PHENYLEPHRINE HCL IN 0.9% NACL 1 MG/10 ML
SYRINGE (ML) INTRAVENOUS AS NEEDED
Status: DISCONTINUED | OUTPATIENT
Start: 2025-06-20 | End: 2025-06-20

## 2025-06-20 RX ORDER — ONDANSETRON 4 MG/1
4 TABLET, FILM COATED ORAL EVERY 6 HOURS PRN
Status: DISCONTINUED | OUTPATIENT
Start: 2025-06-20 | End: 2025-06-20

## 2025-06-20 RX ORDER — ONDANSETRON HYDROCHLORIDE 2 MG/ML
4 INJECTION, SOLUTION INTRAVENOUS EVERY 6 HOURS PRN
Status: DISCONTINUED | OUTPATIENT
Start: 2025-06-20 | End: 2025-06-22 | Stop reason: HOSPADM

## 2025-06-20 RX ORDER — DIPHENHYDRAMINE HYDROCHLORIDE 50 MG/ML
25 INJECTION, SOLUTION INTRAMUSCULAR; INTRAVENOUS EVERY 4 HOURS PRN
Status: DISCONTINUED | OUTPATIENT
Start: 2025-06-20 | End: 2025-06-22 | Stop reason: HOSPADM

## 2025-06-20 RX ORDER — OXYCODONE HYDROCHLORIDE 10 MG/1
10 TABLET ORAL EVERY 4 HOURS PRN
Status: DISCONTINUED | OUTPATIENT
Start: 2025-06-21 | End: 2025-06-22 | Stop reason: HOSPADM

## 2025-06-20 RX ORDER — OXYTOCIN/0.9 % SODIUM CHLORIDE 30/500 ML
60 PLASTIC BAG, INJECTION (ML) INTRAVENOUS ONCE AS NEEDED
Status: DISCONTINUED | OUTPATIENT
Start: 2025-06-20 | End: 2025-06-22 | Stop reason: HOSPADM

## 2025-06-20 RX ORDER — LABETALOL HYDROCHLORIDE 5 MG/ML
20 INJECTION, SOLUTION INTRAVENOUS ONCE AS NEEDED
Status: DISCONTINUED | OUTPATIENT
Start: 2025-06-20 | End: 2025-06-22 | Stop reason: HOSPADM

## 2025-06-20 RX ORDER — LOPERAMIDE HYDROCHLORIDE 2 MG/1
4 CAPSULE ORAL EVERY 2 HOUR PRN
Status: DISCONTINUED | OUTPATIENT
Start: 2025-06-20 | End: 2025-06-20

## 2025-06-20 RX ORDER — ADHESIVE BANDAGE
10 BANDAGE TOPICAL
Status: DISCONTINUED | OUTPATIENT
Start: 2025-06-20 | End: 2025-06-22 | Stop reason: HOSPADM

## 2025-06-20 RX ORDER — LABETALOL HYDROCHLORIDE 5 MG/ML
20 INJECTION, SOLUTION INTRAVENOUS ONCE AS NEEDED
Status: DISCONTINUED | OUTPATIENT
Start: 2025-06-20 | End: 2025-06-20

## 2025-06-20 RX ORDER — IBUPROFEN 600 MG/1
600 TABLET, FILM COATED ORAL EVERY 6 HOURS
Status: DISCONTINUED | OUTPATIENT
Start: 2025-06-21 | End: 2025-06-20

## 2025-06-20 RX ORDER — BUPIVACAINE HYDROCHLORIDE 7.5 MG/ML
INJECTION INTRAVENOUS AS NEEDED
Status: DISCONTINUED | OUTPATIENT
Start: 2025-06-20 | End: 2025-06-20

## 2025-06-20 RX ORDER — MORPHINE SULFATE 0.5 MG/ML
INJECTION, SOLUTION EPIDURAL; INTRATHECAL; INTRAVENOUS AS NEEDED
Status: DISCONTINUED | OUTPATIENT
Start: 2025-06-20 | End: 2025-06-20

## 2025-06-20 RX ORDER — SIMETHICONE 80 MG
80 TABLET,CHEWABLE ORAL 4 TIMES DAILY PRN
Status: DISCONTINUED | OUTPATIENT
Start: 2025-06-20 | End: 2025-06-22 | Stop reason: HOSPADM

## 2025-06-20 RX ORDER — CALCIUM CARBONATE 200(500)MG
1 TABLET,CHEWABLE ORAL EVERY 6 HOURS PRN
Status: DISCONTINUED | OUTPATIENT
Start: 2025-06-20 | End: 2025-06-20

## 2025-06-20 RX ORDER — ONDANSETRON HYDROCHLORIDE 2 MG/ML
4 INJECTION, SOLUTION INTRAVENOUS EVERY 6 HOURS PRN
Status: DISCONTINUED | OUTPATIENT
Start: 2025-06-20 | End: 2025-06-20

## 2025-06-20 RX ORDER — CARBOPROST TROMETHAMINE 250 UG/ML
250 INJECTION, SOLUTION INTRAMUSCULAR ONCE AS NEEDED
Status: DISCONTINUED | OUTPATIENT
Start: 2025-06-20 | End: 2025-06-20

## 2025-06-20 RX ORDER — ACETAMINOPHEN 325 MG/1
975 TABLET ORAL EVERY 6 HOURS
Status: DISCONTINUED | OUTPATIENT
Start: 2025-06-20 | End: 2025-06-20

## 2025-06-20 RX ORDER — CEFAZOLIN 1 G/1
INJECTION, POWDER, FOR SOLUTION INTRAVENOUS AS NEEDED
Status: DISCONTINUED | OUTPATIENT
Start: 2025-06-20 | End: 2025-06-20

## 2025-06-20 RX ORDER — HYDRALAZINE HYDROCHLORIDE 20 MG/ML
5 INJECTION INTRAMUSCULAR; INTRAVENOUS ONCE AS NEEDED
Status: DISCONTINUED | OUTPATIENT
Start: 2025-06-20 | End: 2025-06-22 | Stop reason: HOSPADM

## 2025-06-20 RX ORDER — ACETAMINOPHEN 325 MG/1
975 TABLET ORAL EVERY 6 HOURS
Status: DISCONTINUED | OUTPATIENT
Start: 2025-06-20 | End: 2025-06-22 | Stop reason: HOSPADM

## 2025-06-20 RX ORDER — LIDOCAINE HYDROCHLORIDE 10 MG/ML
20 INJECTION, SOLUTION INFILTRATION; PERINEURAL ONCE AS NEEDED
Status: COMPLETED | OUTPATIENT
Start: 2025-06-20 | End: 2025-06-20

## 2025-06-20 RX ORDER — HYDRALAZINE HYDROCHLORIDE 20 MG/ML
5 INJECTION INTRAMUSCULAR; INTRAVENOUS ONCE AS NEEDED
Status: DISCONTINUED | OUTPATIENT
Start: 2025-06-20 | End: 2025-06-20

## 2025-06-20 RX ORDER — HYDROXYZINE HYDROCHLORIDE 25 MG/1
25 TABLET, FILM COATED ORAL EVERY 6 HOURS PRN
Status: DISCONTINUED | OUTPATIENT
Start: 2025-06-20 | End: 2025-06-22 | Stop reason: HOSPADM

## 2025-06-20 RX ORDER — OXYTOCIN/0.9 % SODIUM CHLORIDE 30/500 ML
60 PLASTIC BAG, INJECTION (ML) INTRAVENOUS ONCE AS NEEDED
Status: DISCONTINUED | OUTPATIENT
Start: 2025-06-20 | End: 2025-06-20

## 2025-06-20 RX ORDER — MISOPROSTOL 200 UG/1
800 TABLET ORAL ONCE AS NEEDED
Status: DISCONTINUED | OUTPATIENT
Start: 2025-06-20 | End: 2025-06-20

## 2025-06-20 RX ORDER — METHYLERGONOVINE MALEATE 0.2 MG/ML
0.2 INJECTION INTRAVENOUS ONCE AS NEEDED
Status: DISCONTINUED | OUTPATIENT
Start: 2025-06-20 | End: 2025-06-22 | Stop reason: HOSPADM

## 2025-06-20 RX ORDER — TERBUTALINE SULFATE 1 MG/ML
0.25 INJECTION SUBCUTANEOUS ONCE AS NEEDED
Status: DISCONTINUED | OUTPATIENT
Start: 2025-06-20 | End: 2025-06-20

## 2025-06-20 RX ORDER — SODIUM CHLORIDE, SODIUM LACTATE, POTASSIUM CHLORIDE, CALCIUM CHLORIDE 600; 310; 30; 20 MG/100ML; MG/100ML; MG/100ML; MG/100ML
75 INJECTION, SOLUTION INTRAVENOUS CONTINUOUS
Status: DISCONTINUED | OUTPATIENT
Start: 2025-06-20 | End: 2025-06-20

## 2025-06-20 RX ORDER — IBUPROFEN 600 MG/1
600 TABLET, FILM COATED ORAL EVERY 6 HOURS
Status: DISCONTINUED | OUTPATIENT
Start: 2025-06-21 | End: 2025-06-22 | Stop reason: HOSPADM

## 2025-06-20 RX ORDER — CARBOPROST TROMETHAMINE 250 UG/ML
250 INJECTION, SOLUTION INTRAMUSCULAR ONCE AS NEEDED
Status: DISCONTINUED | OUTPATIENT
Start: 2025-06-20 | End: 2025-06-22 | Stop reason: HOSPADM

## 2025-06-20 RX ORDER — POLYETHYLENE GLYCOL 3350 17 G/17G
17 POWDER, FOR SOLUTION ORAL 2 TIMES DAILY PRN
Status: DISCONTINUED | OUTPATIENT
Start: 2025-06-20 | End: 2025-06-22 | Stop reason: HOSPADM

## 2025-06-20 RX ORDER — LIDOCAINE 560 MG/1
1 PATCH PERCUTANEOUS; TOPICAL; TRANSDERMAL
Status: DISCONTINUED | OUTPATIENT
Start: 2025-06-20 | End: 2025-06-22 | Stop reason: HOSPADM

## 2025-06-20 RX ORDER — HYDROMORPHONE HYDROCHLORIDE 0.2 MG/ML
0.2 INJECTION INTRAMUSCULAR; INTRAVENOUS; SUBCUTANEOUS EVERY 5 MIN PRN
Status: DISCONTINUED | OUTPATIENT
Start: 2025-06-20 | End: 2025-06-22 | Stop reason: HOSPADM

## 2025-06-20 RX ORDER — DEXMEDETOMIDINE IN 0.9 % NACL 20 MCG/5ML
SYRINGE (ML) INTRAVENOUS AS NEEDED
Status: DISCONTINUED | OUTPATIENT
Start: 2025-06-20 | End: 2025-06-20

## 2025-06-20 RX ORDER — TRANEXAMIC ACID 1 G/10ML
1000 INJECTION, SOLUTION INTRAVENOUS ONCE AS NEEDED
Status: DISCONTINUED | OUTPATIENT
Start: 2025-06-20 | End: 2025-06-22 | Stop reason: HOSPADM

## 2025-06-20 RX ORDER — ENOXAPARIN SODIUM 100 MG/ML
40 INJECTION SUBCUTANEOUS EVERY 24 HOURS
Status: DISCONTINUED | OUTPATIENT
Start: 2025-06-21 | End: 2025-06-22 | Stop reason: HOSPADM

## 2025-06-20 RX ORDER — KETOROLAC TROMETHAMINE 30 MG/ML
INJECTION, SOLUTION INTRAMUSCULAR; INTRAVENOUS AS NEEDED
Status: DISCONTINUED | OUTPATIENT
Start: 2025-06-20 | End: 2025-06-20

## 2025-06-20 RX ORDER — KETOROLAC TROMETHAMINE 30 MG/ML
30 INJECTION, SOLUTION INTRAMUSCULAR; INTRAVENOUS EVERY 6 HOURS
Status: DISCONTINUED | OUTPATIENT
Start: 2025-06-20 | End: 2025-06-20

## 2025-06-20 RX ORDER — ONDANSETRON 4 MG/1
4 TABLET, FILM COATED ORAL EVERY 6 HOURS PRN
Status: DISCONTINUED | OUTPATIENT
Start: 2025-06-20 | End: 2025-06-22 | Stop reason: HOSPADM

## 2025-06-20 RX ORDER — FAMOTIDINE 10 MG/ML
INJECTION INTRAVENOUS AS NEEDED
Status: DISCONTINUED | OUTPATIENT
Start: 2025-06-20 | End: 2025-06-20

## 2025-06-20 RX ORDER — METHYLERGONOVINE MALEATE 0.2 MG/ML
0.2 INJECTION INTRAVENOUS ONCE AS NEEDED
Status: DISCONTINUED | OUTPATIENT
Start: 2025-06-20 | End: 2025-06-20

## 2025-06-20 RX ORDER — PHENYLEPHRINE 10 MG/250 ML(40 MCG/ML)IN 0.9 % SOD.CHLORIDE INTRAVENOUS
CONTINUOUS PRN
Status: DISCONTINUED | OUTPATIENT
Start: 2025-06-20 | End: 2025-06-20

## 2025-06-20 RX ORDER — HYDROXYZINE PAMOATE 25 MG/1
25 CAPSULE ORAL 3 TIMES DAILY PRN
Qty: 30 CAPSULE | Refills: 2 | Status: SHIPPED | OUTPATIENT
Start: 2025-06-20 | End: 2025-07-20

## 2025-06-20 RX ORDER — LIDOCAINE HYDROCHLORIDE 10 MG/ML
INJECTION, SOLUTION INFILTRATION; PERINEURAL AS NEEDED
Status: DISCONTINUED | OUTPATIENT
Start: 2025-06-20 | End: 2025-06-20

## 2025-06-20 RX ORDER — HYDROMORPHONE HYDROCHLORIDE 0.2 MG/ML
0.2 INJECTION INTRAMUSCULAR; INTRAVENOUS; SUBCUTANEOUS EVERY 5 MIN PRN
Status: DISCONTINUED | OUTPATIENT
Start: 2025-06-20 | End: 2025-06-20 | Stop reason: HOSPADM

## 2025-06-20 RX ORDER — HYDROMORPHONE HYDROCHLORIDE 1 MG/ML
0.4 INJECTION, SOLUTION INTRAMUSCULAR; INTRAVENOUS; SUBCUTANEOUS EVERY 5 MIN PRN
Status: DISCONTINUED | OUTPATIENT
Start: 2025-06-20 | End: 2025-06-20 | Stop reason: HOSPADM

## 2025-06-20 RX ORDER — DIPHENHYDRAMINE HCL 25 MG
25 CAPSULE ORAL EVERY 4 HOURS PRN
Status: DISCONTINUED | OUTPATIENT
Start: 2025-06-20 | End: 2025-06-22 | Stop reason: HOSPADM

## 2025-06-20 RX ORDER — HYDROXYZINE HYDROCHLORIDE 25 MG/1
25 TABLET, FILM COATED ORAL EVERY 6 HOURS PRN
Status: DISCONTINUED | OUTPATIENT
Start: 2025-06-20 | End: 2025-06-20

## 2025-06-20 RX ORDER — KETOROLAC TROMETHAMINE 30 MG/ML
30 INJECTION, SOLUTION INTRAMUSCULAR; INTRAVENOUS EVERY 6 HOURS
Status: COMPLETED | OUTPATIENT
Start: 2025-06-20 | End: 2025-06-21

## 2025-06-20 RX ORDER — MISOPROSTOL 200 UG/1
800 TABLET ORAL ONCE AS NEEDED
Status: DISCONTINUED | OUTPATIENT
Start: 2025-06-20 | End: 2025-06-22 | Stop reason: HOSPADM

## 2025-06-20 RX ORDER — LOPERAMIDE HYDROCHLORIDE 2 MG/1
4 CAPSULE ORAL EVERY 2 HOUR PRN
Status: DISCONTINUED | OUTPATIENT
Start: 2025-06-20 | End: 2025-06-22 | Stop reason: HOSPADM

## 2025-06-20 RX ADMIN — KETOROLAC TROMETHAMINE 30 MG: 30 INJECTION, SOLUTION INTRAMUSCULAR; INTRAVENOUS at 20:19

## 2025-06-20 RX ADMIN — Medication 80 MCG: at 13:39

## 2025-06-20 RX ADMIN — MORPHINE SULFATE 3 MG: 0.5 INJECTION EPIDURAL; INTRATHECAL; INTRAVENOUS at 13:21

## 2025-06-20 RX ADMIN — ONDANSETRON 4 MG: 2 INJECTION, SOLUTION INTRAMUSCULAR; INTRAVENOUS at 12:28

## 2025-06-20 RX ADMIN — Medication 80 MCG: at 14:11

## 2025-06-20 RX ADMIN — KETOROLAC TROMETHAMINE 30 MG: 30 INJECTION, SOLUTION INTRAMUSCULAR; INTRAVENOUS at 13:47

## 2025-06-20 RX ADMIN — LIDOCAINE HYDROCHLORIDE 3 ML: 10 INJECTION, SOLUTION INFILTRATION; PERINEURAL at 13:39

## 2025-06-20 RX ADMIN — SODIUM CHLORIDE, SODIUM LACTATE, POTASSIUM CHLORIDE, AND CALCIUM CHLORIDE: 600; 310; 30; 20 INJECTION, SOLUTION INTRAVENOUS at 13:45

## 2025-06-20 RX ADMIN — Medication 8 MCG: at 13:59

## 2025-06-20 RX ADMIN — SODIUM CHLORIDE, SODIUM LACTATE, POTASSIUM CHLORIDE, AND CALCIUM CHLORIDE: 600; 310; 30; 20 INJECTION, SOLUTION INTRAVENOUS at 12:34

## 2025-06-20 RX ADMIN — BUPIVACAINE HYDROCHLORIDE IN DEXTROSE 1.6 ML: 7.5 INJECTION, SOLUTION SUBARACHNOID at 12:41

## 2025-06-20 RX ADMIN — SODIUM CHLORIDE, SODIUM LACTATE, POTASSIUM CHLORIDE, AND CALCIUM CHLORIDE: 600; 310; 30; 20 INJECTION, SOLUTION INTRAVENOUS at 13:00

## 2025-06-20 RX ADMIN — ACETAMINOPHEN 650 MG: 120 SUPPOSITORY RECTAL at 14:16

## 2025-06-20 RX ADMIN — OXYTOCIN 600 MILLI-UNITS/MIN: 10 INJECTION, SOLUTION INTRAMUSCULAR; INTRAVENOUS at 13:19

## 2025-06-20 RX ADMIN — SODIUM CHLORIDE, SODIUM LACTATE, POTASSIUM CHLORIDE, AND CALCIUM CHLORIDE 75 ML/HR: .6; .31; .03; .02 INJECTION, SOLUTION INTRAVENOUS at 08:30

## 2025-06-20 RX ADMIN — CEFAZOLIN 2 G: 1 INJECTION, POWDER, FOR SOLUTION INTRAMUSCULAR; INTRAVENOUS at 12:58

## 2025-06-20 RX ADMIN — FAMOTIDINE 20 MG: 10 INJECTION, SOLUTION INTRAVENOUS at 12:28

## 2025-06-20 RX ADMIN — ACETAMINOPHEN 975 MG: 325 TABLET ORAL at 20:19

## 2025-06-20 RX ADMIN — PHENYLEPHRINE-NACL IV SOLUTION 10 MG/250ML-0.9% 0.58 MCG/KG/MIN: 10-0.9/25 SOLUTION at 12:41

## 2025-06-20 SDOH — SOCIAL STABILITY: SOCIAL INSECURITY
WITHIN THE LAST YEAR, HAVE YOU BEEN RAPED OR FORCED TO HAVE ANY KIND OF SEXUAL ACTIVITY BY YOUR PARTNER OR EX-PARTNER?: NO

## 2025-06-20 SDOH — SOCIAL STABILITY: SOCIAL INSECURITY: VERBAL ABUSE: DENIES

## 2025-06-20 SDOH — SOCIAL STABILITY: SOCIAL INSECURITY: WITHIN THE LAST YEAR, HAVE YOU BEEN AFRAID OF YOUR PARTNER OR EX-PARTNER?: NO

## 2025-06-20 SDOH — SOCIAL STABILITY: SOCIAL INSECURITY: WITHIN THE LAST YEAR, HAVE YOU BEEN HUMILIATED OR EMOTIONALLY ABUSED IN OTHER WAYS BY YOUR PARTNER OR EX-PARTNER?: NO

## 2025-06-20 SDOH — SOCIAL STABILITY: SOCIAL INSECURITY: ARE YOU OR HAVE YOU BEEN THREATENED OR ABUSED PHYSICALLY, EMOTIONALLY, OR SEXUALLY BY ANYONE?: NO

## 2025-06-20 SDOH — SOCIAL STABILITY: SOCIAL INSECURITY
WITHIN THE LAST YEAR, HAVE YOU BEEN KICKED, HIT, SLAPPED, OR OTHERWISE PHYSICALLY HURT BY YOUR PARTNER OR EX-PARTNER?: NO

## 2025-06-20 SDOH — SOCIAL STABILITY: SOCIAL INSECURITY: ARE THERE ANY APPARENT SIGNS OF INJURIES/BEHAVIORS THAT COULD BE RELATED TO ABUSE/NEGLECT?: NO

## 2025-06-20 SDOH — SOCIAL STABILITY: SOCIAL INSECURITY: HAS ANYONE EVER THREATENED TO HURT YOUR FAMILY OR YOUR PETS?: NO

## 2025-06-20 SDOH — HEALTH STABILITY: MENTAL HEALTH: NON-SPECIFIC ACTIVE SUICIDAL THOUGHTS (PAST 1 MONTH): NO

## 2025-06-20 SDOH — HEALTH STABILITY: MENTAL HEALTH: SUICIDAL BEHAVIOR (LIFETIME): NO

## 2025-06-20 SDOH — HEALTH STABILITY: MENTAL HEALTH: HAVE YOU USED ANY SUBSTANCES (CANABIS, COCAINE, HEROIN, HALLUCINOGENS, INHALANTS, ETC.) IN THE PAST 12 MONTHS?: NO

## 2025-06-20 SDOH — ECONOMIC STABILITY: FOOD INSECURITY: WITHIN THE PAST 12 MONTHS, YOU WORRIED THAT YOUR FOOD WOULD RUN OUT BEFORE YOU GOT THE MONEY TO BUY MORE.: NEVER TRUE

## 2025-06-20 SDOH — HEALTH STABILITY: MENTAL HEALTH: WISH TO BE DEAD (PAST 1 MONTH): NO

## 2025-06-20 SDOH — ECONOMIC STABILITY: FOOD INSECURITY: WITHIN THE PAST 12 MONTHS, THE FOOD YOU BOUGHT JUST DIDN'T LAST AND YOU DIDN'T HAVE MONEY TO GET MORE.: NEVER TRUE

## 2025-06-20 SDOH — HEALTH STABILITY: MENTAL HEALTH: CURRENT SMOKER: 0

## 2025-06-20 SDOH — ECONOMIC STABILITY: HOUSING INSECURITY: DO YOU FEEL UNSAFE GOING BACK TO THE PLACE WHERE YOU ARE LIVING?: NO

## 2025-06-20 SDOH — SOCIAL STABILITY: SOCIAL INSECURITY: DO YOU FEEL ANYONE HAS EXPLOITED OR TAKEN ADVANTAGE OF YOU FINANCIALLY OR OF YOUR PERSONAL PROPERTY?: NO

## 2025-06-20 SDOH — HEALTH STABILITY: MENTAL HEALTH: WERE YOU ABLE TO COMPLETE ALL THE BEHAVIORAL HEALTH SCREENINGS?: YES

## 2025-06-20 SDOH — HEALTH STABILITY: MENTAL HEALTH: HAVE YOU USED ANY PRESCRIPTION DRUGS OTHER THAN PRESCRIBED IN THE PAST 12 MONTHS?: NO

## 2025-06-20 SDOH — SOCIAL STABILITY: SOCIAL INSECURITY: HAVE YOU HAD ANY THOUGHTS OF HARMING ANYONE ELSE?: NO

## 2025-06-20 SDOH — SOCIAL STABILITY: SOCIAL INSECURITY: DOES ANYONE TRY TO KEEP YOU FROM HAVING/CONTACTING OTHER FRIENDS OR DOING THINGS OUTSIDE YOUR HOME?: NO

## 2025-06-20 SDOH — SOCIAL STABILITY: SOCIAL INSECURITY: ABUSE SCREEN: ADULT

## 2025-06-20 SDOH — SOCIAL STABILITY: SOCIAL INSECURITY: HAVE YOU HAD THOUGHTS OF HARMING ANYONE ELSE?: NO

## 2025-06-20 SDOH — SOCIAL STABILITY: SOCIAL INSECURITY: PHYSICAL ABUSE: DENIES

## 2025-06-20 ASSESSMENT — LIFESTYLE VARIABLES
HOW OFTEN DO YOU HAVE 6 OR MORE DRINKS ON ONE OCCASION: NEVER
AUDIT-C TOTAL SCORE: 0
SKIP TO QUESTIONS 9-10: 1
HOW OFTEN DO YOU HAVE A DRINK CONTAINING ALCOHOL: NEVER
AUDIT-C TOTAL SCORE: 0
HOW MANY STANDARD DRINKS CONTAINING ALCOHOL DO YOU HAVE ON A TYPICAL DAY: PATIENT DOES NOT DRINK

## 2025-06-20 ASSESSMENT — PAIN DESCRIPTION - LOCATION: LOCATION: ABDOMEN

## 2025-06-20 ASSESSMENT — PAIN DESCRIPTION - DESCRIPTORS
DESCRIPTORS: SORE

## 2025-06-20 ASSESSMENT — PAIN SCALES - GENERAL
PAINLEVEL_OUTOF10: 0 - NO PAIN
PAINLEVEL_OUTOF10: 4
PAINLEVEL_OUTOF10: 0 - NO PAIN
PAINLEVEL_OUTOF10: 0 - NO PAIN
PAINLEVEL_OUTOF10: 5 - MODERATE PAIN
PAINLEVEL_OUTOF10: 5 - MODERATE PAIN
PAINLEVEL_OUTOF10: 0 - NO PAIN
PAINLEVEL_OUTOF10: 3
PAINLEVEL_OUTOF10: 0 - NO PAIN
PAINLEVEL_OUTOF10: 5 - MODERATE PAIN
PAINLEVEL_OUTOF10: 0 - NO PAIN
PAIN_LEVEL: 2

## 2025-06-20 ASSESSMENT — PATIENT HEALTH QUESTIONNAIRE - PHQ9
SUM OF ALL RESPONSES TO PHQ9 QUESTIONS 1 & 2: 0
1. LITTLE INTEREST OR PLEASURE IN DOING THINGS: NOT AT ALL
2. FEELING DOWN, DEPRESSED OR HOPELESS: NOT AT ALL

## 2025-06-20 ASSESSMENT — ACTIVITIES OF DAILY LIVING (ADL): LACK_OF_TRANSPORTATION: NO

## 2025-06-20 ASSESSMENT — PAIN - FUNCTIONAL ASSESSMENT: PAIN_FUNCTIONAL_ASSESSMENT: 0-10

## 2025-06-20 NOTE — L&D DELIVERY NOTE
Birth Operative Report    Patient Name: Rochelle Joseph  : 1990  MRN: [id number]  Age: 34 y.o.    /Para:   Gestational Age: 39w1d    Date of Surgery: 2025    Operating Room Location: [place] [address]    Pre-op Diagnosis:  Intrauterine Pregnancy at 39w1d  History of 3 prior c-sections  GDMA1    Post-op Diagnosis:  Same    Procedure:   Repeat Low Transverse  Section    Surgery Category at [facility] Time: Confirmed Scheduled, Non-urgent    Surgeon:    * Corinne A Bazella - Primary    Resident/Fellow/Other Assistant:   Surgeons and Role:  Ca Alvarez MD PGY-3    Anesthesia:  Type: CSE     Anesthesiologist: Teresa Kaur MD  Anesthesia Resident: Fátima Ybarra MD  Frontline Breaker: TRAE Decker    Surgical Staff:  Circulator: Serina Hill RN  Scrub Person: Christian Steinberg     Preoperative Antibiotics: Ancef 2 g    Indication for Procedure:   34 y.o.  at 39w1d who presents for scheduled repeat .     Informed Consent:  The risks, benefits, complications, and alternatives were discussed with the patient. The patient understood that the risks of  section include but are not limited to infection, bleeding, injury to nearby structures or organs, possible need for transfusion, and potential need for more surgery. The patient stated understanding and desired to proceed. All questions were answered. The site of surgery was properly noted and marked. The patient's identity was confirmed, and the procedure verified as a  delivery. A Time Out was held and the above information confirmed.     Findings:   Normal appearing gravid uterus, fallopian tubes, and ovaries. Amniotic fluid Clear, Male infant in Vertex Occiput Transverse presentation, APGARS 9 , 9 .  Birth Weight 3.04 kg. Minimal intrabdominal adhesive disease present.    Description of Procedure:   Patient was taken to the OR where regional anesthesia was found to be adequate.  She  was then placed in the dorsal supine position with a left lateral tilt. A yao catheter was placed, SCDs were applied, and a vaginal prep was performed. A pre-procedure time out was performed. The patient was given a prophylactic dose of IV antibiotics.  She was then prepped and draped in the usual sterile fashion.     A Pfannenstiel skin incision was made with the scalpel through the skin and subcutaneous fat to the underlying fascial layer. The fascia was incised in the midline with the scalpel and the incision was extended bilaterally. The superior aspect of the incision was grasped, tented up with Kocher clamps and the rectus muscle was dissected off. The muscles were divided in the midline, the peritoneum was then identified and entered bluntly and incision extended superiorly and inferiorly taking care to avoid underlying viscera.  The bladder blade was inserted.       Uterine Incision was made with the scalpel, the uterine cavity was entered, and the hysterotomy was extended cephalocaudally by stretching. Difficult with delivery of infant therefore Kiwi vacuum was applied and infant was delivered on first pull. The cord was clamped and cut and infant was handed off to awaiting nursing.  A cord segment and blood sample were collected. The placenta was then expressed. The uterus was cleared of all clot and debris. The uterine incision was repaired using a running locked stitch of 0-Vicryl in two layers. Adequate hemostasis was noted.    The hysterotomy was again evaluated and found to be hemostatic, the underside of the fascia and bladder and the rectus muscles were also found to be hemostatic. The fascia was closed using a running stitch off 0-vicryl.  The subcutaneous layer was irrigated, small bleeders were cauterized. The subcutaneous layer was re-approximated using a running stitch of 3-0 Monocryl. The skin was closed with 4-0 vicryl.         * Corinne A Bazella - Primary was present for key portions of  the procedure.    Additional Procedures:  None    Task Performed by: RN or PA Surgical Assistant: N/A    Complications:      Quantitative Blood Loss:   Delivery Blood Loss: 500 mL (2025  1:05 PM - 2025  3:15 PM)    Blood products: none   Blood Product Administration History       None            Uterotonics/Hemostatic Agent: IV Pitocin 30 units    Specimen:   Placenta  Delivered: 2025  1:21 PM  Appearance: Intact  Removal: Spontaneous    Disposition: discarded    Sponge/Instrument/Needle Counts: The sponge, lap and needle counts were correct.    Patient Disposition: Patient recovering on labor and delivery in stable condition.    Tish Joseph [[id number]]      Labor Events    Sac identifier: Sac 1  Rupture date/time: 2025 1317  Rupture type: Artificial  Fluid color: Clear  Fluid odor: None  Labor type:  Without Labor  Labor allowed to proceed with plans for an attempted vaginal birth?: No  Complications: None       Placenta    Placenta delivery date/time: 2025 13:21  Placenta removal: Spontaneous  Placenta appearance: Intact  Placenta disposition: discarded       Cord    Vessels: 3 vessels  Complications: None  Delayed cord clamping?: Yes  Cord clamped date/time: 2025 13:20:00  Cord blood disposition: Lab  Gases sent?: No  Stem cell collection (by provider): No       Lacerations    Episiotomy: None  Perineal laceration: None  Other lacerations?: No  Repair suture: None       Anesthesia    Method: Combined spinal-epidural       Operative Delivery    Forceps attempted?: No  Vacuum extractor attempted?: No  Vacuum application location: None  Failed vacuum delivery?: No       Shoulder Dystocia    Shoulder dystocia present?: No       Reading Delivery    Birth date/time: 2025 13:19:00  Delivery type: , Low Transverse   categorization: repeat   priority: scheduled  Indications for : Repeat   Complications: None       Resuscitation     Method: Tactile stimulation       Apgars    Living status: Living  Apgar Component Scores:  1 min.:  5 min.:  10 min.:  15 min.:  20 min.:    Skin color:  1  1       Heart rate:  2  2       Reflex irritability:  2  2       Muscle tone:  2  2       Respiratory effort:  2  2       Total:  9  9       Apgars assigned by: MOUSTAPHA BANERJEE       Delivery Providers    Delivering clinician: Corinne A Bazella, MD   Provider Role    Serina Hill RN Delivery Nurse    Maribeth Banerjee RN Nursery Nurse    Ca Alvarez MD Resident    Christian Steinberg Scrub Nurse

## 2025-06-20 NOTE — ANESTHESIA POSTPROCEDURE EVALUATION
Patient: Rochelle Joseph    Procedure Summary       Date: 25 Room / Location: [facility] 04 / Virtual [facility] 2 OB    Anesthesia Start: 1234 Anesthesia Stop: 1429    Procedure:  Section Diagnosis:       Uterine scar from previous  delivery affecting pregnancy ([facility])      (Uterine scar from previous  delivery affecting pregnancy ([facility]) [O34.219])    Surgeons: Corinne A Bazella, MD Responsible Provider: Teresa Kaur MD    Anesthesia Type: CSE ASA Status: 2            Anesthesia Type: CSE    Vitals Value Taken Time   /84 25 14:29   Temp 36.2 25 14:29   Pulse 84 25 14:29   Resp 14 25 14:29   SpO2 100 25 14:29       Anesthesia Post Evaluation    Patient location during evaluation: bedside  Patient participation: complete - patient participated  Level of consciousness: awake and alert  Pain score: 2  Pain management: adequate  Airway patency: patent  Cardiovascular status: acceptable and hemodynamically stable  Respiratory status: acceptable  Hydration status: acceptable  Postoperative Nausea and Vomiting: none        No notable events documented.    Rochelle Joseph is a 34 y.o., , who had a , Low Transverse delivery on 2025 at 39w1d and is now POD1.    She had Neuraxial Anesthesia without immediate complications noted.       Pain was appropriately tolerated by patient.     Vitals:    25 0812   BP: 101/66   Pulse: 81   Resp: 17   Temp: 36.6 °C (97.9 °F)   SpO2: 97%       Neuraxial site assessed. No visible redness or swelling. Pain acceptably controlled. Patient able to ambulate and move all extremities without difficulty. Able to void. No complaints of nausea/vomiting. Tolerating PO intake well. No s/sx of PDPH.     Neuraxial site without redness, drainage, swelling.  Neuromuscular block resolved.    Anesthesia will sign off     Branden Ramsey MD

## 2025-06-20 NOTE — ANESTHESIA PROCEDURE NOTES
CSE Block    Patient location during procedure: OR  Start time: 6/20/2025 12:31 PM  End time: 6/20/2025 12:42 PM  Reason for block: primary anesthetic}  Staffing  Performed: CRNA and resident   Authorized by: Teresa Kaur MD    Performed by: Fátima Ybarra MD    Preanesthetic Checklist  Completed: patient identified, IV checked, risks and benefits discussed, surgical consent, monitors and equipment checked, pre-op evaluation, timeout performed and sterile techniques followed  Block Timeout  RN/Licensed healthcare professional reads aloud to the Anesthesia provider and entire team: Patient identity, procedure with side and site, patient position, and as applicable the availability of implants/special equipment/special requirements.  Patient on coagulant treatment: no  Timeout performed at: 6/20/2025 12:31 PM    CSE Block  Patient position: sitting  Prep: ChloraPrep  Sterility prep: mask, hand, gloves, drape and cap  Sedation level: no sedation  Patient monitoring: blood pressure, heart rate and continuous pulse oximetry  Approach: midline  Local numbing: lidocaine 1% to skin and subcutaneous tissues  Vertebral space: lumbar  L3-4  Guidance: landmark technique    Epidural Needle  AUBREY technique: saline  Needle type: Tuohy   Needle gauge: 17 G  Needle length: 8.9 cm  Needle insertion depth: 6 cm  Spinal Needle  Needle type: PENCAN.   Needle gauge: 25 G  Needle length: 12.7 cm  Free flow CSF: yes  Epidural Catheter  Catheter type: multi-orifice  Catheter size: 17 G.  Catheter at skin depth: 10 cm  Catheter securement method: clear occlusive dressing and liquid medical adhesive              Assessment  Sensory level: T6 bilateral  Block outcome: patient comfortable  Number of attempts: 1  Procedure assessment: patient tolerated procedure well with no immediate complications

## 2025-06-20 NOTE — CARE PLAN
The patient's goals for the shift include      The clinical goals for the shift include safe  delivery of baby boy    Problem: Postpartum  Goal: Experiences normal postpartum course  Outcome: Progressing  Goal: Appropriate maternal -  bonding  Outcome: Progressing  Goal: Establish and maintain infant feeding pattern for adequate nutrition  Outcome: Progressing  Goal: Incisions, wounds, or drain sites healing without S/S of infection  Outcome: Progressing  Goal: No s/sx infection  Outcome: Progressing  Goal: No s/sx of hemorrhage  Outcome: Progressing  Goal: Minimal s/sx of HDP and BP<160/110  Outcome: Progressing     Problem:  Recovery Care  Goal: Verbalizes understanding of post-op instructions  Outcome: Progressing  Goal: Manages discomfort  Outcome: Progressing  Goal: Dressing intact until removed with any drainage marked  Outcome: Progressing  Goal: Patient vital signs are stable  Outcome: Progressing  Goal: Urine output is 0.5 mL/kg/hr or more  Outcome: Progressing  Goal: Fundus firm at midline  Outcome: Progressing     Problem: Pain - Adult  Goal: Verbalizes/displays adequate comfort level or baseline comfort level  Outcome: Progressing     Problem: Safety - Adult  Goal: Free from fall injury  Outcome: Progressing     Problem: Discharge Planning  Goal: Discharge to home or other facility with appropriate resources  Outcome: Progressing

## 2025-06-20 NOTE — ANESTHESIA PREPROCEDURE EVALUATION
Patient: Rochelle Joseph    Evaluation Method: In-person visit    Procedure Information       Date/Time: 25 1000    Procedure:  Section    Location: [facility] 04 / Virtual [facility] 2 OB    Surgeons: Corinne A Bazella, MD            Relevant Problems   Anesthesia (within normal limits)      Cardiac (within normal limits)      Pulmonary (within normal limits)      Neuro   (+) Anxiety      GI (within normal limits)      /Renal (within normal limits)      Liver (within normal limits)      Endocrine   (+) Diet controlled gestational diabetes mellitus (GDM) in third trimester ([facility])      Hematology (within normal limits)      Musculoskeletal (within normal limits)      HEENT (within normal limits)      ID (within normal limits)      Skin (within normal limits)      GYN   (+) 38 weeks gestation of pregnancy ([facility])   (+) 39 weeks gestation of pregnancy ([facility])   (+) High-risk pregnancy, third trimester ([facility])       Clinical information reviewed:   Tobacco  Allergies  Meds   Med Hx  Surg Hx   Fam Hx          NPO Detail:  No data recorded     OB/Gyn Evaluation    Present Pregnancy    Patient is pregnant now.  (+) , previous  section, gestational diabetes - GDMA1   Obstetric History                Physical Exam    Airway  Mallampati: II  TM distance: >3 FB  Neck ROM: full     Cardiovascular - normal exam  Rhythm: regular     Dental - normal exam     Pulmonary    Abdominal            Anesthesia Plan    History of general anesthesia?: yes  History of complications of general anesthesia?: no    ASA 2     CSE     The patient is not a current smoker.    Anesthetic plan and risks discussed with patient.  Use of blood products discussed with patient who consented to blood products.    Plan discussed with attending.

## 2025-06-20 NOTE — H&P
OB Admission H&P    Assessment/Plan    Rochelle Joseph is a 34 y.o.  at 39w1d, LORY: 2025, by Last Menstrual Period, who is admitted for repeat  delivery.    Plan   -Admit to L&D for repeat , consented  -T&S, CBC, previously ordered  -Recheck as clinically indicated by maternal or fetal status  -GDM- well controlled on diet- all BS within goal throughout entire pregnancy- will do 2 hour GTT post-partum  -Anxiety- discussed meds for PP- had a consult with Dr. Waldron- she would prefer to not have a daily medication and use an as needed med- sent hydroxyzine to her home pharmacy for PRN and she will [employer] message if she wants to start Zoloft in the PP period.    She would like Discharge on POD #2 if no medical complications, as the hospital environment is hard for her anxiety.    Fetal Status  -NST reactive, reassuring   -Presentation VTX based on ultrasound  -EFW 7 by shar  -GBS negative    Postpartum  Contraception Plan: partner vasectomy    Pregnancy Problems (from 24 to present)       Problem Noted Diagnosed Resolved    39 weeks gestation of pregnancy ([facility]) 2025 by Marisol Rosales MD  No    Priority:  Medium       Diet controlled gestational diabetes mellitus (GDM) in third trimester ([facility]) 2025 by Corinne A Bazella, MD  No    Priority:  Medium       High-risk pregnancy, third trimester ([facility]) 2024 by KIARA Mcguire  No    Priority:  Medium       38 weeks gestation of pregnancy ([facility]) 2024 by KIARA Mcguire  No    Priority:  Medium       Overview Addendum 2025  9:47 AM by Corinne A Bazella, MD   Desired provider in labor: [] CNM  [x] Physician-->repeat c/s x 3  [x] Blood Products: [x] Yes, accepts [] No, needs counseling  [x] Initial BMI: 32.00   [x] Prenatal Labs: wnl  [x] Cervical Cancer Screening up to date: due    [x] Rh status:  O+  [x] NT US: (11-13 wks): 0.9mm wnl  [x] Baby ASA (if  indicated): sent   [x] Pregnancy dated by: LMP = 7w1d scan    [x] Anatomy US: (19-20 wks)  [x] Federal Sterilization consent signed (if indicated): not indicated  [x] 1hr GCT at 24-28wks: if elevated will do blood sugar testing over doing the 3 hour due to history of diet controlled GDM- elevated- will treat like GDM  [x] Fetal Surveillance (if indicated): not indicated  [x] Tdap (27-32 wks, may be given up to 36 wks if initial window missed):       [x] Breastfeeding: none  [x] Postpartum Birth control method: maybe OCPs- 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  Ready for her surgery    Prenatal Provider [facility] History    Para Term  AB Living   3 2 2 0 0 2   SAB IAB Ectopic Multiple Live Births   0 0 0 0 2      # Outcome Date GA Lbr Jules/2nd Weight Sex Type Anes PTL Lv   3 Current            2 Term 22 39w0d  3.515 kg F CS-LTranv  N SOWMYA      Complications: Gestational diabetes      Name: Antonia   1 Term 10/03/20 41w0d  3.232 kg F CS-LTranv  N SOWMYA      Complications: Fetal Intolerance, Chorioamnionitis, Gestational diabetes      Name: Della       Surgical History[1]    Social History     Tobacco Use    Smoking status: Never    Smokeless tobacco: Never   Substance Use Topics    Alcohol use: Not Currently     Comment: occ       Allergies[2]    Prescriptions Prior to Admission[3]  Objective     Last Vitals  Temp Pulse Resp BP MAP O2 Sat   36.6 °C (97.9 °F) 96 16 95/64 76 99 %     Blood Pressures         2025  0843 2025  0855          BP: 95/56 95/64               Physical Exam  General: NAD, mood appropriate  Cardiopulmonary: warm and well perfused, breathing comfortably on room air  Abdomen: Gravid, non-tender  Extremities: Symmetric  Speculum Exam: deferred       Fetal Monitoring  Baseline: 150 bpm, Variability:  moderate,  Accelerations: absent and Decelerations: none  Uterine Activity: No contractions seen on toco  Interpretation: Reactive    Bedside ultrasound: Yes    Labs in chart were reviewed.   Results from last 7 days   Lab Units 25  0907   QUEST WBC AUTO [place]/uL 7.9   QUEST HEMOGLOBIN g/dL 11.4*   QUEST HEMATOCRIT % 35.9   QUEST PLATELETS AUTO [place]/uL 248        Prenatal labs reviewed, not remarkable.             [1]   Past Surgical History:  Procedure Laterality Date     SECTION, LOW TRANSVERSE      TONSILLECTOMY  2018    Tonsillectomy   [2]   Allergies  Allergen Reactions    Amoxicillin Rash    Cefaclor Rash   [3]   Medications Prior to Admission   Medication Sig Dispense Refill Last Dose/Taking    aspirin 81 mg chewable tablet Chew 2 tablets (162 mg) once daily. 180 tablet 2 Past Month    blood sugar diagnostic (OneTouch Ultra Test) Use 1 strip 4-6 times per day during pregnancy 150 each 3     blood-glucose meter misc Use for testing blood sugar 4-6 times per day during pregnancy 1 each 0     FreeStyle lancets 28 gauge Patient should test glucose 4 times daily. In the morning, and 1 hour after each meal breakfast , lunch and dinner. 200 each 3

## 2025-06-21 LAB
ERYTHROCYTE [DISTWIDTH] IN BLOOD BY AUTOMATED COUNT: 13.2 % (ref 11.5–14.5)
HCT VFR BLD AUTO: 32 % (ref 36–46)
HGB BLD-MCNC: 9.9 G/DL (ref 12–16)
MCH RBC QN AUTO: 28.6 PG (ref 26–34)
MCHC RBC AUTO-ENTMCNC: 30.9 G/DL (ref 32–36)
MCV RBC AUTO: 93 FL (ref 80–100)
NRBC BLD-RTO: 0 /100 WBCS (ref 0–0)
PLATELET # BLD AUTO: 206 X10*3/UL (ref 150–450)
RBC # BLD AUTO: 3.46 X10*6/UL (ref 4–5.2)
WBC # BLD AUTO: 9.3 X10*3/UL (ref 4.4–11.3)

## 2025-06-21 PROCEDURE — 85027 COMPLETE CBC AUTOMATED: CPT

## 2025-06-21 PROCEDURE — 2500000004 HC RX 250 GENERAL PHARMACY W/ HCPCS (ALT 636 FOR OP/ED)

## 2025-06-21 PROCEDURE — 1100000001 HC PRIVATE ROOM DAILY

## 2025-06-21 PROCEDURE — 2500000001 HC RX 250 WO HCPCS SELF ADMINISTERED DRUGS (ALT 637 FOR MEDICARE OP)

## 2025-06-21 PROCEDURE — 99231 SBSQ HOSP IP/OBS SF/LOW 25: CPT

## 2025-06-21 PROCEDURE — 36415 COLL VENOUS BLD VENIPUNCTURE: CPT

## 2025-06-21 RX ADMIN — ACETAMINOPHEN 975 MG: 325 TABLET ORAL at 02:25

## 2025-06-21 RX ADMIN — ACETAMINOPHEN 975 MG: 325 TABLET ORAL at 14:02

## 2025-06-21 RX ADMIN — KETOROLAC TROMETHAMINE 30 MG: 30 INJECTION, SOLUTION INTRAMUSCULAR; INTRAVENOUS at 02:25

## 2025-06-21 RX ADMIN — IBUPROFEN 600 MG: 600 TABLET ORAL at 20:22

## 2025-06-21 RX ADMIN — KETOROLAC TROMETHAMINE 30 MG: 30 INJECTION, SOLUTION INTRAMUSCULAR; INTRAVENOUS at 08:32

## 2025-06-21 RX ADMIN — ACETAMINOPHEN 975 MG: 325 TABLET ORAL at 20:22

## 2025-06-21 RX ADMIN — ENOXAPARIN SODIUM 40 MG: 100 INJECTION SUBCUTANEOUS at 08:32

## 2025-06-21 RX ADMIN — IBUPROFEN 600 MG: 600 TABLET ORAL at 14:02

## 2025-06-21 RX ADMIN — ACETAMINOPHEN 975 MG: 325 TABLET ORAL at 08:32

## 2025-06-21 ASSESSMENT — PAIN SCALES - GENERAL
PAINLEVEL_OUTOF10: 3
PAINLEVEL_OUTOF10: 0 - NO PAIN
PAINLEVEL_OUTOF10: 3
PAINLEVEL_OUTOF10: 4

## 2025-06-21 ASSESSMENT — PAIN DESCRIPTION - LOCATION
LOCATION: ABDOMEN
LOCATION: ABDOMEN
LOCATION: INCISION

## 2025-06-21 ASSESSMENT — PAIN DESCRIPTION - DESCRIPTORS
DESCRIPTORS: SORE
DESCRIPTORS: SORE

## 2025-06-21 ASSESSMENT — PAIN - FUNCTIONAL ASSESSMENT
PAIN_FUNCTIONAL_ASSESSMENT: 0-10

## 2025-06-21 NOTE — CARE PLAN
Problem: Postpartum  Goal: Experiences normal postpartum course  Outcome: Progressing  Goal: Appropriate maternal -  bonding  Outcome: Progressing  Goal: Establish and maintain infant feeding pattern for adequate nutrition  Outcome: Progressing  Goal: Incisions, wounds, or drain sites healing without S/S of infection  Outcome: Progressing  Goal: No s/sx infection  Outcome: Progressing  Goal: No s/sx of hemorrhage  Outcome: Progressing  Goal: Minimal s/sx of HDP and BP<160/110  Outcome: Progressing     Problem: Pain - Adult  Goal: Verbalizes/displays adequate comfort level or baseline comfort level  Outcome: Progressing   The patient's goals for the shift include rest & bond with baby    The clinical goals for the shift include lochia scant to moderate    Vital signs stable throughout the shift, lochia has been WNL, patient is without s/s of HDP. Patient is bonding well with her !

## 2025-06-21 NOTE — CARE PLAN
The patient's goals for the shift include rest, bonding with     The clinical goals for the shift include adequate pain control, vitals WNL    VSS and assessment WNL. Bleeding and swelling minimal. Pain controlled with medications. Incision dressing c/d/I. Sarmiento removed, pt ambulated, now voiding. Bottle fed overnight. Bonding well with infant.      Problem: Postpartum  Goal: Experiences normal postpartum course  Outcome: Progressing  Goal: Appropriate maternal -  bonding  Outcome: Progressing  Goal: Establish and maintain infant feeding pattern for adequate nutrition  Outcome: Progressing  Goal: Incisions, wounds, or drain sites healing without S/S of infection  Outcome: Progressing  Goal: No s/sx infection  Outcome: Progressing  Goal: No s/sx of hemorrhage  Outcome: Progressing  Goal: Minimal s/sx of HDP and BP<160/110  Outcome: Progressing     Problem:  Recovery Care  Goal: Verbalizes understanding of post-op instructions  Outcome: Progressing  Goal: Manages discomfort  Outcome: Progressing  Goal: Dressing intact until removed with any drainage marked  Outcome: Progressing  Goal: Patient vital signs are stable  Outcome: Progressing  Goal: Urine output is 0.5 mL/kg/hr or more  Outcome: Progressing  Goal: Fundus firm at midline  Outcome: Progressing     Problem: Pain - Adult  Goal: Verbalizes/displays adequate comfort level or baseline comfort level  Outcome: Progressing

## 2025-06-21 NOTE — PROGRESS NOTES
Postpartum Progress Note    Assessment/Plan   Rochelle Josehp is a 34 y.o., , who delivered at 39w1d gestation    Now PPD#1 s/p , Low Transverse on 2025  - Continue routine postpartum care  - Pain well controlled on po medications  - DVT risk score DVT Score (IF A SCORE IS NOT CALCULATING, MUST SELECT A BMI TO COMPLETE): 6 , ppx with SCDs, ambulation, and lovenox  - RH positive, rhogam not indicated  - Hgb:   Results from last 7 days   Lab Units 25  0552 25  0947 25  0907   HEMOGLOBIN g/dL 9.9* 11.1*  --    [facility] HEMOGLOBIN g/dL  --   --  11.4*        Acute Blood Loss Anemia  - EBL 595cc  - Hgb trend as above  - Asymptomatic  - PO iron on d/c     Anxiety  - Atarax PRN   - Mood stable  - Declines needs for additional resources at this time     GDMA  - POC glucose: 95  - For 2hr GTT at 6wks PP     Maternal Well-Being  - Vitals stable  - All questions and concerns address     Feeding  - Breastfeeding/pumping encouraged  - Lactation consult prn    Contraception  - Declines  - Education provided    Dispo  - Anticipate d/c on PPD #3 if meeting all postpartum milestones., however, desires POD#2 discharge if stable  - Follow-up in 1-2wks for incision check  - Follow-up in 4-6wks with primary MONI Cole, LESLYE-CNP     Assessment & Plan  Uterine scar from previous  delivery affecting pregnancy ([facility])    39 weeks gestation of pregnancy ([facility])    Pregnancy Problems (from 24 to present)       Problem Noted Diagnosed Resolved    39 weeks gestation of pregnancy ([facility]) 2025 by Marisol Rosales MD  No    Priority:  Medium       Diet controlled gestational diabetes mellitus (GDM) in third trimester ([facility]) 2025 by Corinne A Bazella, MD  No    Priority:  Medium       High-risk pregnancy, third trimester ([facility]) 2024 by LESLYE Mcguire-CNM  No    Priority:  Medium       38 weeks gestation of pregnancy ([facility]) 2024 by  Aleena Tee, APRN-CLARK  No    Priority:  Medium       Overview Addendum 2025  9:47 AM by Corinne A Bazella, MD   Desired provider in labor: [] CNM  [x] Physician-->repeat c/s x 3  [x] Blood Products: [x] Yes, accepts [] No, needs counseling  [x] Initial BMI: 32.00   [x] Prenatal Labs: wnl  [x] Cervical Cancer Screening up to date: due    [x] Rh status:  O+  [x] NT US: (11-13 wks): 0.9mm wnl  [x] Baby ASA (if indicated): sent   [x] Pregnancy dated by: LMP = 7w1d scan    [x] Anatomy US: (19-20 wks)  [x] Federal Sterilization consent signed (if indicated): not indicated  [x] 1hr GCT at 24-28wks: if elevated will do blood sugar testing over doing the 3 hour due to history of diet controlled GDM- elevated- will treat like GDM  [x] Fetal Surveillance (if indicated): not indicated  [x] Tdap (27-32 wks, may be given up to 36 wks if initial window missed):       [x] Breastfeeding: none  [x] Postpartum Birth control method: maybe OCPs- 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  No acute events overnight.  Voiding spontaneously, passing flatus.  Pain well controlled on PO meds.  Light lochia. Tolerating diet.  Denies HA, CP, SOB, RUQ pain, vision changes or N/V. Denies dizziness, lightheadedness, LOC, or uncontrolled bleeding.    Objective   Allergies:   Amoxicillin and Cefaclor         Last Vitals:  Temp Pulse Resp BP MAP Pulse Ox   36.5 °C (97.7 °F) 85 18 100/67   96 %     Vitals Min/Max Last 24 Hours:  Temp  Min: 36 °C (96.8 °F)  Max: 36.8 °C (98.2 °F)  Pulse  Min: 59  Max: 94  Resp  Min: 16  Max: 18  BP  Min: 100/67  Max: 125/84    Intake/Output:     Intake/Output Summary (Last 24 hours) at 2025 1300  Last data filed at 2025 1059  Gross per 24 hour   Intake 2420 ml   Output  2590 ml   Net -170 ml       Physical Exam:  General: examination reveals a well developed, well nourished, female, in no acute distress. She is alert and cooperative.  HEENT: external ears normal. Nose normal, no erythema or discharge.  Neck: supple, no significant adenopathy  Lungs: breathing even and unlabored, lungs clear  Cardiac: warm and well perfused, heart rate regular  Fundus: firm and below umbilicus, lochia light  Abdominal: soft, non-tender, non-distended, bowel sounds active  Extremities: no redness or tenderness in the calves or thighs, edema: non-pitting BLE  Neurological: alert, oriented, normal speech, no focal findings or movement disorder noted.  Psychological: awake and alert; oriented to person, place, and time.  Skin: Initial post-op dressing clean, dry, and intact    Lab Data:  Labs in chart were reviewed.

## 2025-06-22 VITALS
TEMPERATURE: 98.1 F | DIASTOLIC BLOOD PRESSURE: 81 MMHG | BODY MASS INDEX: 34.69 KG/M2 | HEIGHT: 62 IN | SYSTOLIC BLOOD PRESSURE: 116 MMHG | WEIGHT: 188.49 LBS | OXYGEN SATURATION: 97 % | HEART RATE: 83 BPM | RESPIRATION RATE: 20 BRPM

## 2025-06-22 PROCEDURE — 2500000001 HC RX 250 WO HCPCS SELF ADMINISTERED DRUGS (ALT 637 FOR MEDICARE OP)

## 2025-06-22 PROCEDURE — 99238 HOSP IP/OBS DSCHRG MGMT 30/<: CPT

## 2025-06-22 PROCEDURE — 2500000004 HC RX 250 GENERAL PHARMACY W/ HCPCS (ALT 636 FOR OP/ED)

## 2025-06-22 RX ORDER — POLYETHYLENE GLYCOL 3350 17 G/17G
17 POWDER, FOR SOLUTION ORAL 2 TIMES DAILY PRN
Qty: 30 EACH | Refills: 0 | Status: SHIPPED | OUTPATIENT
Start: 2025-06-22

## 2025-06-22 RX ORDER — DOCUSATE SODIUM 100 MG/1
100 CAPSULE, LIQUID FILLED ORAL 2 TIMES DAILY PRN
Qty: 30 CAPSULE | Refills: 5 | Status: SHIPPED | OUTPATIENT
Start: 2025-06-22

## 2025-06-22 RX ORDER — IBUPROFEN 600 MG/1
600 TABLET, FILM COATED ORAL EVERY 6 HOURS
Qty: 40 TABLET | Refills: 0 | Status: SHIPPED | OUTPATIENT
Start: 2025-06-22 | End: 2025-07-02

## 2025-06-22 RX ORDER — FERROUS SULFATE 324(65)MG
65 TABLET, DELAYED RELEASE (ENTERIC COATED) ORAL
Qty: 30 TABLET | Refills: 1 | Status: SHIPPED | OUTPATIENT
Start: 2025-06-22

## 2025-06-22 RX ORDER — ACETAMINOPHEN 325 MG/1
975 TABLET ORAL EVERY 6 HOURS
Qty: 120 TABLET | Refills: 0 | Status: SHIPPED | OUTPATIENT
Start: 2025-06-22 | End: 2025-07-02

## 2025-06-22 RX ADMIN — ACETAMINOPHEN 975 MG: 325 TABLET ORAL at 08:21

## 2025-06-22 RX ADMIN — IBUPROFEN 600 MG: 600 TABLET ORAL at 14:13

## 2025-06-22 RX ADMIN — ENOXAPARIN SODIUM 40 MG: 100 INJECTION SUBCUTANEOUS at 08:23

## 2025-06-22 RX ADMIN — IBUPROFEN 600 MG: 600 TABLET ORAL at 02:11

## 2025-06-22 RX ADMIN — ACETAMINOPHEN 975 MG: 325 TABLET ORAL at 14:13

## 2025-06-22 RX ADMIN — ACETAMINOPHEN 975 MG: 325 TABLET ORAL at 02:11

## 2025-06-22 RX ADMIN — IBUPROFEN 600 MG: 600 TABLET ORAL at 08:21

## 2025-06-22 ASSESSMENT — PAIN SCALES - GENERAL
PAINLEVEL_OUTOF10: 3
PAINLEVEL_OUTOF10: 4

## 2025-06-22 ASSESSMENT — PAIN DESCRIPTION - DESCRIPTORS: DESCRIPTORS: SORE;TENDER

## 2025-06-22 ASSESSMENT — PAIN DESCRIPTION - LOCATION: LOCATION: INCISION

## 2025-06-22 ASSESSMENT — PAIN - FUNCTIONAL ASSESSMENT: PAIN_FUNCTIONAL_ASSESSMENT: 0-10

## 2025-06-22 NOTE — DISCHARGE SUMMARY
Discharge Summary    Rochelle Joseph is a 34 y.o. year old female , who delivered at 39w1d gestation via , Low Transverse, now PPD#2.    Admission Date: 2025  Discharge Date: 25       Discharge Diagnosis  , Low Transverse    Hospital Course  Delivery Date: 2025 1:19 PM  Delivery type: , Low Transverse   GA at delivery: 39w1d   Outcome: Living  Intrapartum complications: None  Feeding method: Breastfeeding Status: No       Contraception: Declines  We discussed pregnancy spacing of at least one year, abstaining from intercourse for 6wks, and the ability to become pregnant in the absence of regular menses. Pt verbalized understanding.    Pertinent Physical Exam At Time of Discharge    Physical Exam  Vitals reviewed.   Constitutional:       Appearance: Normal appearance.   HENT:      Head: Normocephalic.   Cardiovascular:      Rate and Rhythm: Normal rate and regular rhythm.      Pulses: Normal pulses.      Heart sounds: Normal heart sounds.   Pulmonary:      Effort: Pulmonary effort is normal.      Breath sounds: Normal breath sounds.   Abdominal:      General: Abdomen is flat. Bowel sounds are normal.      Palpations: Abdomen is soft.      Comments: Fundus firm, midline, below umbilicus, light lochia   C/S incision WNL--no erythema or drainage, edges intact, well-approximated      Skin:     General: Skin is warm.   Neurological:      Mental Status: She is alert.   Psychiatric:         Mood and Affect: Mood normal.         Behavior: Behavior normal.          Pertinent Subjective at Time of Discharge  Rochelle Joseph is a 34 y.o. year old female , who delivered at 39w1d gestation via , Low Transverse, now PPD#2, who reports feeling overall well.  No acute events overnight.  Voiding spontaneously, passing flatus.  Pain well controlled on PO meds.  Light lochia. Tolerating diet.  Denies HA, CP, SOB, RUQ pain, vision changes or N/V. Denies dizziness,  "lightheadedness, LOC, or uncontrolled bleeding.    Vitals  /81 (BP Location: Left arm, Patient Position: Sitting)   Pulse 83   Temp 36.7 °C (98.1 °F) (Temporal)   Resp 20   Ht 1.575 m (5' 2\")   Wt 85.5 kg (188 lb 7.9 oz)   LMP 09/19/2024   SpO2 97%   Breastfeeding No   BMI 34.48 kg/m²      Discharge Meds     Your medication list        START taking these medications        Instructions Last Dose Given Next Dose Due   acetaminophen 325 mg tablet  Commonly known as: Tylenol      Take 3 tablets (975 mg) by mouth every 6 hours for 10 days.       docusate sodium 100 mg capsule  Commonly known as: Colace      Take 1 capsule (100 mg) by mouth 2 times a day as needed for constipation.       ferrous sulfate 324 mg (65 mg elemental iron) EC tablet (delayed release)      Take 1 tablet (65 mg) by mouth once daily with breakfast.       hydrOXYzine pamoate 25 mg capsule  Commonly known as: VistariL      Take 1 capsule (25 mg) by mouth 3 times a day as needed for anxiety.       ibuprofen 600 mg tablet      Take 1 tablet (600 mg) by mouth every 6 hours for 10 days.       polyethylene glycol 17 gram packet  Commonly known as: Glycolax, Miralax      Take 17 g by mouth 2 times a day as needed (first line).              CONTINUE taking these medications        Instructions Last Dose Given Next Dose Due   blood-glucose meter misc      Use for testing blood sugar 4-6 times per day during pregnancy       FreeStyle lancets 28 gauge      Patient should test glucose 4 times daily. In the morning, and 1 hour after each meal breakfast , lunch and dinner.              STOP taking these medications      aspirin 81 mg chewable tablet        OneTouch Ultra Test  Generic drug: blood sugar diagnostic                  Where to Get Your Medications        These medications were sent to [id number] [address] [facility]  [address] [facility], [place], [facility] [id number]      Phone: [phone]   acetaminophen 325 mg " tablet  docusate sodium 100 mg capsule  ferrous sulfate 324 mg (65 mg elemental iron) EC tablet (delayed release)  hydrOXYzine pamoate 25 mg capsule  ibuprofen 600 mg tablet  polyethylene glycol 17 gram packet          Complications Requiring Follow-Up  Acute Blood Loss Anemia  - EBL 595cc  - Hgb: 11.1--> 9.9  - Asymptomatic  - PO iron on d/c      Anxiety  - Atarax PRN   - Mood stable  - Declines needs for additional resources at this time      GDMA  - POC glucose: 95  - For 2hr GTT at 6wks PP     Continues to meet all postpartum milestones.  Safe and stable for d/c home. Return precautions given.  Follow-up in 1-2wk for incision check and 4-6wk with Dr. Broussard.     Test Results Pending At Discharge  Pending Labs       No current pending labs.            Outpatient Follow-Up    I spent 20 minutes in the professional and overall care of this patient.      Luanne Cole, APRN-CNP

## 2025-06-22 NOTE — CARE PLAN
The patient's goals for the shift include rest    The clinical goals for the shift include light to scant lochia      Problem: Postpartum  Goal: Experiences normal postpartum course  Outcome: Met  Goal: Appropriate maternal -  bonding  Outcome: Met  Goal: Establish and maintain infant feeding pattern for adequate nutrition  Outcome: Met  Goal: Incisions, wounds, or drain sites healing without S/S of infection  Outcome: Met     Problem: Pain - Adult  Goal: Verbalizes/displays adequate comfort level or baseline comfort level  Outcome: Met     Problem: Safety - Adult  Goal: Free from fall injury  Outcome: Met     Problem: Discharge Planning  Goal: Discharge to home or other facility with appropriate resources  Outcome: Met   Date and Time: ....  Nursing Note/Discharge: 2025 1:30PM  D: According to plan, patient discharged to home with baby.  Instructions printed and reviewed, see Discharge Instructions sheet.  Teaching provided on new medications, self care, signs and symptoms to report, PI sheets, and follow-up appointment.  Patient verbalized understanding and denies any questions at time of discharge.  Patient instructed to call MD/LIP with any additional questions after discharge.    E: Discharge teaching complete and patient is prepared for discharge.   P: Patient sent home with written and verbal instructions via transport in stable condition.  Signature: Chelsie Willard RN

## 2025-06-22 NOTE — CARE PLAN
The patient's goals for the shift include rest, bonding with     The clinical goals for the shift include adequate pain control, vitals WNL    VSS and assessment WNL. Bleeding and swelling minimal. Pain controlled with medications. Bottle feeding. Incision c/d/i. Ambulating. Bonding well with infant.      Problem: Postpartum  Goal: Experiences normal postpartum course  Outcome: Progressing  Goal: Appropriate maternal -  bonding  Outcome: Progressing  Goal: Establish and maintain infant feeding pattern for adequate nutrition  Outcome: Progressing  Goal: Incisions, wounds, or drain sites healing without S/S of infection  Outcome: Progressing  Goal: No s/sx infection  Outcome: Progressing  Goal: No s/sx of hemorrhage  Outcome: Progressing  Goal: Minimal s/sx of HDP and BP<160/110  Outcome: Progressing     Problem: Pain - Adult  Goal: Verbalizes/displays adequate comfort level or baseline comfort level  Outcome: Progressing     Problem: Pain  Goal: Turns in bed with improved pain control throughout the shift  Outcome: Progressing  Goal: Walks with improved pain control throughout the shift  Outcome: Progressing

## 2025-06-25 ENCOUNTER — APPOINTMENT (OUTPATIENT)
Dept: OBSTETRICS AND GYNECOLOGY | Facility: CLINIC | Age: 35
End: 2025-06-25
Payer: COMMERCIAL

## 2025-07-02 ENCOUNTER — APPOINTMENT (OUTPATIENT)
Dept: OBSTETRICS AND GYNECOLOGY | Facility: CLINIC | Age: 35
End: 2025-07-02
Payer: COMMERCIAL

## 2025-07-02 VITALS
HEIGHT: 62 IN | BODY MASS INDEX: 31.47 KG/M2 | WEIGHT: 171 LBS | DIASTOLIC BLOOD PRESSURE: 82 MMHG | SYSTOLIC BLOOD PRESSURE: 120 MMHG

## 2025-07-02 PROBLEM — O24.410 DIET CONTROLLED GESTATIONAL DIABETES MELLITUS (GDM) IN THIRD TRIMESTER (HHS-HCC): Status: RESOLVED | Noted: 2025-04-09 | Resolved: 2025-07-02

## 2025-07-02 PROBLEM — O34.219 PREVIOUS CESAREAN SECTION COMPLICATING PREGNANCY (HHS-HCC): Status: RESOLVED | Noted: 2024-11-21 | Resolved: 2025-07-02

## 2025-07-02 PROBLEM — O34.219 UTERINE SCAR FROM PREVIOUS CESAREAN DELIVERY AFFECTING PREGNANCY (HHS-HCC): Status: RESOLVED | Noted: 2025-04-10 | Resolved: 2025-07-02

## 2025-07-02 PROBLEM — Z3A.39 39 WEEKS GESTATION OF PREGNANCY (HHS-HCC): Status: RESOLVED | Noted: 2025-06-20 | Resolved: 2025-07-02

## 2025-07-02 PROBLEM — Z3A.38 38 WEEKS GESTATION OF PREGNANCY (HHS-HCC): Status: RESOLVED | Noted: 2024-11-21 | Resolved: 2025-07-02

## 2025-07-02 PROBLEM — O09.93 HIGH-RISK PREGNANCY, THIRD TRIMESTER (HHS-HCC): Status: RESOLVED | Noted: 2024-11-21 | Resolved: 2025-07-02

## 2025-07-02 PROCEDURE — 96127 BRIEF EMOTIONAL/BEHAV ASSMT: CPT | Performed by: OBSTETRICS & GYNECOLOGY

## 2025-07-02 PROCEDURE — 0503F POSTPARTUM CARE VISIT: CPT | Performed by: OBSTETRICS & GYNECOLOGY

## 2025-07-02 ASSESSMENT — EDINBURGH POSTNATAL DEPRESSION SCALE (EPDS)
I HAVE LOOKED FORWARD WITH ENJOYMENT TO THINGS: AS MUCH AS I EVER DID
THE THOUGHT OF HARMING MYSELF HAS OCCURRED TO ME: NEVER
I HAVE BLAMED MYSELF UNNECESSARILY WHEN THINGS WENT WRONG: YES, SOME OF THE TIME
I HAVE BEEN ABLE TO LAUGH AND SEE THE FUNNY SIDE OF THINGS: AS MUCH AS I ALWAYS COULD
I HAVE BEEN SO UNHAPPY THAT I HAVE HAD DIFFICULTY SLEEPING: NOT VERY OFTEN
I HAVE FELT SAD OR MISERABLE: NOT VERY OFTEN
I HAVE BEEN SO UNHAPPY THAT I HAVE BEEN CRYING: ONLY OCCASIONALLY
THINGS HAVE BEEN GETTING ON TOP OF ME: NO, MOST OF THE TIME I HAVE COPED QUITE WELL
TOTAL SCORE: 10
I HAVE FELT SCARED OR PANICKY FOR NO GOOD REASON: YES, SOMETIMES
I HAVE BEEN ANXIOUS OR WORRIED FOR NO GOOD REASON: YES, SOMETIMES

## 2025-07-02 ASSESSMENT — PAIN SCALES - GENERAL: PAINLEVEL_OUTOF10: 0-NO PAIN

## 2025-07-02 NOTE — PROGRESS NOTES
"2 week incision check     EPDS = 10  Date of Delivery: 25  Delivery type: C/S  Feeding: Formula  Circumcised: YES  Baby name: Yossi  Gender: BOY  Complications: NONE  Problems: NONE      Assessment   IMPRESSIONS:  Thank you for coming to your postpartum visit. Everything seems to be recovering appropriately at this time. You are free to resume normal activity. Please call us for breast complaints, abnormal bleeding, mood changes, or other concerning symptoms - we have many good treatment options for these issues.     Problem List Items Addressed This Visit    None       Corinne A Bazella, MD    Subjective   35 y.o.  presenting for postpartum follow-up     Type of Delivery: C/S      Mood: EPDS Score 10  Contraceptive Method: VAS  Feeding Method: formula      Objective   /82   Ht 1.575 m (5' 2\")   Wt 77.6 kg (171 lb)   LMP 2024       General:   Alert and oriented, in no acute distress   Neck:    Breast/Axilla:    Abdomen: Soft, non-tender, without masses or organomegaly, inc healed   Vulva:    Vagina:    Cervix:    Uterus:    Adnexa:    Pelvic Floor    Psych Normal affect. Normal mood.       "

## 2025-08-14 ENCOUNTER — APPOINTMENT (OUTPATIENT)
Dept: OBSTETRICS AND GYNECOLOGY | Facility: CLINIC | Age: 35
End: 2025-08-14
Payer: COMMERCIAL

## 2025-08-20 ENCOUNTER — APPOINTMENT (OUTPATIENT)
Dept: OBSTETRICS AND GYNECOLOGY | Facility: CLINIC | Age: 35
End: 2025-08-20
Payer: COMMERCIAL

## 2025-08-20 VITALS
SYSTOLIC BLOOD PRESSURE: 128 MMHG | WEIGHT: 178 LBS | HEIGHT: 62 IN | DIASTOLIC BLOOD PRESSURE: 70 MMHG | BODY MASS INDEX: 32.76 KG/M2

## 2025-08-20 DIAGNOSIS — Z86.32 HISTORY OF GESTATIONAL DIABETES MELLITUS: ICD-10-CM

## 2025-08-20 PROCEDURE — 0503F POSTPARTUM CARE VISIT: CPT | Performed by: OBSTETRICS & GYNECOLOGY

## 2025-08-20 ASSESSMENT — EDINBURGH POSTNATAL DEPRESSION SCALE (EPDS)
THINGS HAVE BEEN GETTING ON TOP OF ME: NO, MOST OF THE TIME I HAVE COPED QUITE WELL
TOTAL SCORE: 10
I HAVE BEEN ABLE TO LAUGH AND SEE THE FUNNY SIDE OF THINGS: AS MUCH AS I ALWAYS COULD
I HAVE BEEN ANXIOUS OR WORRIED FOR NO GOOD REASON: YES, SOMETIMES
I HAVE FELT SCARED OR PANICKY FOR NO GOOD REASON: YES, SOMETIMES
I HAVE BEEN SO UNHAPPY THAT I HAVE HAD DIFFICULTY SLEEPING: NOT VERY OFTEN
THE THOUGHT OF HARMING MYSELF HAS OCCURRED TO ME: NEVER
I HAVE FELT SAD OR MISERABLE: NOT VERY OFTEN
I HAVE BLAMED MYSELF UNNECESSARILY WHEN THINGS WENT WRONG: YES, SOME OF THE TIME
I HAVE LOOKED FORWARD WITH ENJOYMENT TO THINGS: AS MUCH AS I EVER DID
I HAVE BEEN SO UNHAPPY THAT I HAVE BEEN CRYING: ONLY OCCASIONALLY

## 2025-08-20 ASSESSMENT — PAIN SCALES - GENERAL: PAINLEVEL_OUTOF10: 0-NO PAIN

## 2025-10-22 ENCOUNTER — APPOINTMENT (OUTPATIENT)
Dept: OBSTETRICS AND GYNECOLOGY | Facility: CLINIC | Age: 35
End: 2025-10-22
Payer: COMMERCIAL

## 2026-01-27 ENCOUNTER — APPOINTMENT (OUTPATIENT)
Facility: CLINIC | Age: 36
End: 2026-01-27
Payer: COMMERCIAL

## (undated) DEVICE — DRAPE PACK, CESAREAN SECTION, CUSTOM, UHC

## (undated) DEVICE — SUTURE, PDS II, 0 36 IN, CT-1, VIOLET

## (undated) DEVICE — TOWEL PACK, STERILE, 4/PACK, BLUE